# Patient Record
Sex: FEMALE | Race: WHITE | NOT HISPANIC OR LATINO
[De-identification: names, ages, dates, MRNs, and addresses within clinical notes are randomized per-mention and may not be internally consistent; named-entity substitution may affect disease eponyms.]

---

## 2017-11-02 ENCOUNTER — RECORD ABSTRACTING (OUTPATIENT)
Age: 67
End: 2017-11-02

## 2017-11-02 DIAGNOSIS — Z83.79 FAMILY HISTORY OF OTHER DISEASES OF THE DIGESTIVE SYSTEM: ICD-10-CM

## 2017-11-02 DIAGNOSIS — Z82.49 FAMILY HISTORY OF ISCHEMIC HEART DISEASE AND OTHER DISEASES OF THE CIRCULATORY SYSTEM: ICD-10-CM

## 2017-11-02 DIAGNOSIS — Z83.3 FAMILY HISTORY OF DIABETES MELLITUS: ICD-10-CM

## 2017-11-02 DIAGNOSIS — Z83.511 FAMILY HISTORY OF GLAUCOMA: ICD-10-CM

## 2017-11-02 DIAGNOSIS — Z80.0 FAMILY HISTORY OF MALIGNANT NEOPLASM OF DIGESTIVE ORGANS: ICD-10-CM

## 2017-11-06 ENCOUNTER — NON-APPOINTMENT (OUTPATIENT)
Age: 67
End: 2017-11-06

## 2017-11-06 ENCOUNTER — APPOINTMENT (OUTPATIENT)
Dept: FAMILY MEDICINE | Facility: CLINIC | Age: 67
End: 2017-11-06
Payer: COMMERCIAL

## 2017-11-06 VITALS
RESPIRATION RATE: 16 BRPM | TEMPERATURE: 98.7 F | OXYGEN SATURATION: 97 % | SYSTOLIC BLOOD PRESSURE: 118 MMHG | BODY MASS INDEX: 22.18 KG/M2 | HEART RATE: 66 BPM | DIASTOLIC BLOOD PRESSURE: 64 MMHG | WEIGHT: 138 LBS | HEIGHT: 66 IN

## 2017-11-06 DIAGNOSIS — Z82.69 FAMILY HISTORY OF OTHER DISEASES OF THE MUSCULOSKELETAL SYSTEM AND CONNECTIVE TISSUE: ICD-10-CM

## 2017-11-06 DIAGNOSIS — R73.02 IMPAIRED GLUCOSE TOLERANCE (ORAL): ICD-10-CM

## 2017-11-06 PROCEDURE — 93000 ELECTROCARDIOGRAM COMPLETE: CPT

## 2017-11-06 PROCEDURE — 99215 OFFICE O/P EST HI 40 MIN: CPT | Mod: 25

## 2017-11-06 RX ORDER — ZOLPIDEM TARTRATE 5 MG/1
5 TABLET, FILM COATED ORAL
Refills: 0 | Status: DISCONTINUED | COMMUNITY
End: 2017-11-06

## 2017-11-08 ENCOUNTER — RX RENEWAL (OUTPATIENT)
Age: 67
End: 2017-11-08

## 2017-11-09 ENCOUNTER — TRANSCRIPTION ENCOUNTER (OUTPATIENT)
Age: 67
End: 2017-11-09

## 2017-11-10 ENCOUNTER — RX RENEWAL (OUTPATIENT)
Age: 67
End: 2017-11-10

## 2017-11-27 ENCOUNTER — OTHER (OUTPATIENT)
Age: 67
End: 2017-11-27

## 2017-12-05 ENCOUNTER — RECORD ABSTRACTING (OUTPATIENT)
Age: 67
End: 2017-12-05

## 2018-06-04 ENCOUNTER — APPOINTMENT (OUTPATIENT)
Dept: FAMILY MEDICINE | Facility: CLINIC | Age: 68
End: 2018-06-04
Payer: COMMERCIAL

## 2018-06-04 VITALS
HEIGHT: 66 IN | TEMPERATURE: 98.8 F | OXYGEN SATURATION: 98 % | BODY MASS INDEX: 22.18 KG/M2 | WEIGHT: 138 LBS | HEART RATE: 57 BPM | DIASTOLIC BLOOD PRESSURE: 70 MMHG | SYSTOLIC BLOOD PRESSURE: 124 MMHG

## 2018-06-04 LAB
CYTOLOGY CVX/VAG DOC THIN PREP: NORMAL
S PYO AG SPEC QL IA: NORMAL

## 2018-06-04 PROCEDURE — 99214 OFFICE O/P EST MOD 30 MIN: CPT | Mod: 25

## 2018-06-04 PROCEDURE — 87880 STREP A ASSAY W/OPTIC: CPT | Mod: QW

## 2018-06-04 RX ORDER — DICLOFENAC SODIUM 100 MG/1
100 TABLET, FILM COATED, EXTENDED RELEASE ORAL
Qty: 30 | Refills: 0 | Status: COMPLETED | COMMUNITY
Start: 2017-12-05

## 2018-06-04 RX ORDER — AMOXICILLIN AND CLAVULANATE POTASSIUM 875; 125 MG/1; MG/1
875-125 TABLET, COATED ORAL
Qty: 28 | Refills: 0 | Status: COMPLETED | COMMUNITY
Start: 2018-01-08

## 2018-06-04 RX ORDER — ZOLPIDEM TARTRATE 5 MG/1
5 TABLET ORAL
Qty: 30 | Refills: 5 | Status: DISCONTINUED | COMMUNITY
Start: 2017-11-06 | End: 2018-06-04

## 2018-06-04 NOTE — REVIEW OF SYSTEMS
[Fatigue] : fatigue [Earache] : earache [Nasal Discharge] : nasal discharge [Sore Throat] : sore throat [Cough] : cough [Negative] : Gastrointestinal [Insomnia] : insomnia [Anxiety] : anxiety

## 2018-06-04 NOTE — PHYSICAL EXAM
[Ill-Appearing] : ill-appearing [Normal Rhythm/Effort] : normal respiratory rhythm and effort [Clear Bilaterally] : the lungs were clear to auscultation bilaterally [Normal to Percussion] : the lungs were normal to percussion [Normal] : palpation of the chest was normal [Normal Rate] : normal [Normal S1] : normal S1 [Normal S2] : normal S2 [No Murmur] : no murmurs heard [No Pitting Edema] : no pitting edema present [2+] : left 2+ [No Abnormalities] : the abdominal aorta was not enlarged and no bruit was heard [Normal Appearance] : was normal in appearance [Neck Supple] : was supple [Enlarged Diffusely] : was not enlarged [S3] : no S3 [S4] : no S4 [Right Carotid Bruit] : no bruit heard over the right carotid [Left Carotid Bruit] : no bruit heard over the left carotid [Right Femoral Bruit] : no bruit heard over the right femoral artery [Left Femoral Bruit] : no bruit heard over the left femoral artery [Normal Mental Status] : the patient's orientation, memory, attention, language and fund of knowledge were normal [Appropriate] : appropriate [Impaired judgment] : intact judgment [Impaired Insight] : intact insight [de-identified] : red

## 2018-06-04 NOTE — HISTORY OF PRESENT ILLNESS
[FreeTextEntry8] : Pt is here c/o sore throat, coughing up yellow phlegm, nasal congestion, slight bilateral ear pain, headaches on/off for about 8 days. She has not been getting better. Her headaches is very bad. SHe is around a lot of kids. \par \par I would also like to get renewals. The Lexapro is pretty good .It makes me calm and reduces anxiety. i am not depressed. I was getting chest tightness before which was anxiety and not depression. I don’t have it anymore. I had side effects in the beginning and they are gone. Sometimes I go back and forth taking 1 tab or 1 1/2 tabs. sometimes I think I causes headaches\par I like the Ambien It works pretty well. Most times I break it in half. It works very well

## 2018-06-04 NOTE — ASSESSMENT
[FreeTextEntry1] :  As per usual protocol the patient was advised in regards to the risks of driving when on medications with side effects of dizziness or drowsiness. Patient has been assessed  for increase risk for respiratory depression. Istop checked.\par Take antibiotics,  rest,  increase fluids, wash hands to prevent the spread of  infection . Take mucinex dm over the counter. Take tylenol or advil for fever or body aches. Follow up if no better or worse respiratory symptoms.\par throat culture negative\par renew meds for lexapro.\par deferred prev 13

## 2018-07-01 ENCOUNTER — MEDICATION RENEWAL (OUTPATIENT)
Age: 68
End: 2018-07-01

## 2019-05-02 ENCOUNTER — APPOINTMENT (OUTPATIENT)
Dept: FAMILY MEDICINE | Facility: CLINIC | Age: 69
End: 2019-05-02
Payer: COMMERCIAL

## 2019-05-02 VITALS
SYSTOLIC BLOOD PRESSURE: 120 MMHG | HEIGHT: 66 IN | DIASTOLIC BLOOD PRESSURE: 70 MMHG | HEART RATE: 73 BPM | WEIGHT: 141 LBS | OXYGEN SATURATION: 97 % | RESPIRATION RATE: 12 BRPM | BODY MASS INDEX: 22.66 KG/M2

## 2019-05-02 DIAGNOSIS — R92.2 INCONCLUSIVE MAMMOGRAM: ICD-10-CM

## 2019-05-02 PROCEDURE — 99213 OFFICE O/P EST LOW 20 MIN: CPT

## 2019-05-02 RX ORDER — AMOXICILLIN AND CLAVULANATE POTASSIUM 875; 125 MG/1; MG/1
875-125 TABLET, COATED ORAL
Qty: 20 | Refills: 0 | Status: DISCONTINUED | COMMUNITY
Start: 2018-06-04 | End: 2019-05-02

## 2019-05-02 RX ORDER — ESCITALOPRAM OXALATE 10 MG/1
10 TABLET ORAL DAILY
Qty: 90 | Refills: 1 | Status: DISCONTINUED | COMMUNITY
Start: 1900-01-01 | End: 2019-05-02

## 2019-05-02 NOTE — PHYSICAL EXAM
[Well Nourished] : well nourished [Well Developed] : well developed [Normal Outer Ear/Nose] : the outer ears and nose were normal in appearance [Normal Oropharynx] : the oropharynx was normal [Normal TMs] : both tympanic membranes were normal [Supple] : supple [No Lymphadenopathy] : no lymphadenopathy [Clear to Auscultation] : lungs were clear to auscultation bilaterally [No Respiratory Distress] : no respiratory distress  [No Accessory Muscle Use] : no accessory muscle use [Normal Rate] : normal rate  [Regular Rhythm] : with a regular rhythm [Normal S1, S2] : normal S1 and S2

## 2019-05-02 NOTE — HISTORY OF PRESENT ILLNESS
[FreeTextEntry1] : pt presents for med check  [de-identified] : 69 yo wf here for med check for insomnia and mood disorder.  I stopped the lexapro . I still feel the effects of the lexapro withdrawal. I am tired. I need to repeat thyroid sonogram. I have nodules

## 2019-05-02 NOTE — ASSESSMENT
[FreeTextEntry1] : renew medications\par   As per usual protocol the patient was advised in regards to the risks of driving when on medications with side effects of dizziness or drowsiness. Patient has been assessed  for increase risk for respiratory depression. Patient denies suicidal ideations or plan.  Istop checked.\par \par We spent sufficient time to discuss aspects of care; questions were answered  to patient's satisfaction.The diagnosis and care plan were discussed with patient in detail.  Patient test results were  reviewed and explained in full. All questions and concerns  were answered to the best of my knowledge.\par

## 2019-09-08 ENCOUNTER — RX RENEWAL (OUTPATIENT)
Age: 69
End: 2019-09-08

## 2020-03-12 ENCOUNTER — APPOINTMENT (OUTPATIENT)
Dept: FAMILY MEDICINE | Facility: CLINIC | Age: 70
End: 2020-03-12
Payer: COMMERCIAL

## 2020-03-12 VITALS
SYSTOLIC BLOOD PRESSURE: 118 MMHG | BODY MASS INDEX: 22.5 KG/M2 | DIASTOLIC BLOOD PRESSURE: 70 MMHG | OXYGEN SATURATION: 97 % | RESPIRATION RATE: 12 BRPM | HEIGHT: 66 IN | HEART RATE: 64 BPM | WEIGHT: 140 LBS
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
## 2020-03-12 DIAGNOSIS — Z11.4 ENCOUNTER FOR SCREENING FOR HUMAN IMMUNODEFICIENCY VIRUS [HIV]: ICD-10-CM

## 2020-03-12 DIAGNOSIS — Z87.898 PERSONAL HISTORY OF OTHER SPECIFIED CONDITIONS: ICD-10-CM

## 2020-03-12 DIAGNOSIS — Z87.09 PERSONAL HISTORY OF OTHER DISEASES OF THE RESPIRATORY SYSTEM: ICD-10-CM

## 2020-03-12 DIAGNOSIS — M24.131 OTHER ARTICULAR CARTILAGE DISORDERS, RIGHT WRIST: ICD-10-CM

## 2020-03-12 DIAGNOSIS — M24.132 OTHER ARTICULAR CARTILAGE DISORDERS, LEFT WRIST: ICD-10-CM

## 2020-03-12 DIAGNOSIS — H91.90 UNSPECIFIED HEARING LOSS, UNSPECIFIED EAR: ICD-10-CM

## 2020-03-12 DIAGNOSIS — Z86.69 PERSONAL HISTORY OF OTHER DISEASES OF THE NERVOUS SYSTEM AND SENSE ORGANS: ICD-10-CM

## 2020-03-12 DIAGNOSIS — Z23 ENCOUNTER FOR IMMUNIZATION: ICD-10-CM

## 2020-03-12 DIAGNOSIS — F39 UNSPECIFIED MOOD [AFFECTIVE] DISORDER: ICD-10-CM

## 2020-03-12 PROCEDURE — 90653 IIV ADJUVANT VACCINE IM: CPT

## 2020-03-12 PROCEDURE — G0008: CPT

## 2020-03-12 PROCEDURE — 99213 OFFICE O/P EST LOW 20 MIN: CPT | Mod: 25

## 2020-03-12 NOTE — PHYSICAL EXAM
[Scattered Wheezes] : scattered wheezing was heard [Exp Wheezing] : expiratory wheezing was heard [Normal] : no carotid or abdominal bruits heard, no varicosities, pedal pulses are present, no peripheral edema, no extremity clubbing or cyanosis and no palpable aorta

## 2020-03-12 NOTE — HISTORY OF PRESENT ILLNESS
[FreeTextEntry1] : pt presents for med check  [de-identified] : 70 yo wf here for insomnia. I take 1/2 of ambien q night it works well\par I am due for the flu shot\par I have dense breasts Ins would not cover sonogram. Can I order one?\par I have a thyroid nodule I do an annual sonogram\par I have left ear hearing loss-can I get a referral\par i am seeing gyn and will pap and bone density. \par Otherwise patient reports feeling well.  Patient specifically denies chest pain, dyspnea on exertion, edema, PND, orthopnea, dizziness, or syncope. Patient reports compliance with medications. Patient denies fever, chills, night sweats, nausea, vomiting , no pain, erythema, swollen joints, hematuria, hematochezia , hematemesis, or melena.\par ( she is seeing dr stephen for her tfcc bilateral wrists)

## 2020-03-12 NOTE — HEALTH RISK ASSESSMENT
[Patient reported PAP Smear was normal] : Patient reported PAP Smear was normal [Patient reported bone density results were normal] : Patient reported bone density results were normal [Patient reported colonoscopy was normal] : Patient reported colonoscopy was normal [HIV test declined] : HIV test declined [Hepatitis C test declined] : Hepatitis C test declined [Alone] : lives alone [# of Members in Household ___] :  household currently consist of [unfilled] member(s) [Employed] : employed [College] : College [Single] : single [Feels Safe at Home] : Feels safe at home [Fully functional (bathing, dressing, toileting, transferring, walking, feeding)] : Fully functional (bathing, dressing, toileting, transferring, walking, feeding) [Fully functional (using the telephone, shopping, preparing meals, housekeeping, doing laundry, using] : Fully functional and needs no help or supervision to perform IADLs (using the telephone, shopping, preparing meals, housekeeping, doing laundry, using transportation, managing medications and managing finances) [Reports normal functional visual acuity (ie: able to read med bottle)] : Reports normal functional visual acuity [Smoke Detector] : smoke detector [Carbon Monoxide Detector] : carbon monoxide detector [Safety elements used in home] : safety elements used in home [Seat Belt] :  uses seat belt [Sunscreen] : uses sunscreen [No] : In the past 12 months have you used drugs other than those required for medical reasons? No [No falls in past year] : Patient reported no falls in the past year [Change in mental status noted] : No change in mental status noted [Language] : denies difficulty with language [Handling Complex Tasks] : denies difficulty handling complex tasks [Sexually Active] : not sexually active [Reports changes in hearing] : Reports no changes in hearing [Reports changes in vision] : Reports no changes in vision [Reports changes in dental health] : Reports no changes in dental health [Guns at Home] : no guns at home [Travel to Developing Areas] : does not  travel to developing areas [Caregiver Concerns] : does not have caregiver concerns [MammogramDate] : 2019 [PapSmearDate] : 2019 [BoneDensityDate] : 2019 [ColonoscopyDate] : 2018 [de-identified] : rn [] : No [de-identified] : no [de-identified] : gyn dr stephen [Audit-CScore] : 0 [de-identified] : gym planks [de-identified] : no

## 2020-03-12 NOTE — ASSESSMENT
[FreeTextEntry1] : ambien renewed  As per usual protocol the patient was advised in regards to the risks of driving when on medications with side effects of dizziness or drowsiness. Patient has been assessed  for increase risk for respiratory depression. Patient denies suicidal ideations or plan.  Istop checked.\par follow up ent\par  Information regarding flu shot reviewed. All questions answered.Flu shot .5 cc IM  left    deltoid . No reaction noted. Advised patients to wash hands to reduce the spread of infection\par thyroid sono this summer\par breast us for dense breast\par We spent sufficient time to discuss aspects of care; questions were answered  to patient's satisfaction.The diagnosis and care plan were discussed with patient in detail.  Patient test results were  reviewed and explained in full. All questions and concerns  were answered to the best of my knowledge.\par

## 2020-05-14 ENCOUNTER — TRANSCRIPTION ENCOUNTER (OUTPATIENT)
Age: 70
End: 2020-05-14

## 2020-05-14 DIAGNOSIS — Z11.59 ENCOUNTER FOR SCREENING FOR OTHER VIRAL DISEASES: ICD-10-CM

## 2020-05-20 LAB
SARS-COV-2 IGG SERPL IA-ACNC: 0.1 INDEX
SARS-COV-2 IGG SERPL QL IA: NEGATIVE

## 2020-10-12 ENCOUNTER — TRANSCRIPTION ENCOUNTER (OUTPATIENT)
Age: 70
End: 2020-10-12

## 2020-10-13 ENCOUNTER — TRANSCRIPTION ENCOUNTER (OUTPATIENT)
Age: 70
End: 2020-10-13

## 2020-10-21 ENCOUNTER — APPOINTMENT (OUTPATIENT)
Dept: FAMILY MEDICINE | Facility: CLINIC | Age: 70
End: 2020-10-21
Payer: COMMERCIAL

## 2020-10-21 VITALS
WEIGHT: 140 LBS | HEART RATE: 69 BPM | TEMPERATURE: 98.3 F | BODY MASS INDEX: 23.32 KG/M2 | SYSTOLIC BLOOD PRESSURE: 110 MMHG | RESPIRATION RATE: 14 BRPM | OXYGEN SATURATION: 98 % | DIASTOLIC BLOOD PRESSURE: 70 MMHG | HEIGHT: 65 IN

## 2020-10-21 DIAGNOSIS — Z12.39 ENCOUNTER FOR OTHER SCREENING FOR MALIGNANT NEOPLASM OF BREAST: ICD-10-CM

## 2020-10-21 PROCEDURE — G0009: CPT

## 2020-10-21 PROCEDURE — 99213 OFFICE O/P EST LOW 20 MIN: CPT | Mod: 25

## 2020-10-21 PROCEDURE — 99072 ADDL SUPL MATRL&STAF TM PHE: CPT

## 2020-10-21 PROCEDURE — 90732 PPSV23 VACC 2 YRS+ SUBQ/IM: CPT

## 2020-10-21 NOTE — HISTORY OF PRESENT ILLNESS
[FreeTextEntry1] : follow up [de-identified] : 71 yo wf here for ambien it helps her fall asleep  she gets 6 hrs  she stays asleep unless she has to go to the bathroom no side effects.\par \par I have to do thyroid sonogram. I had nodules biopsied in the past\par \par she has asthma she rarely uses ventolin\par \par \par \par

## 2020-10-21 NOTE — ASSESSMENT
[FreeTextEntry1] : Basic cardiovascular prevention measures are advised including regular exercise, surveillance medical examination, and prudent portion-controlled low fat diet, rich in a variety of vegetables with minimal added sugars, refined starches, and no artificially hydrogenated oils.\par  As per usual protocol the patient was advised in regards to the risks of driving when on medications with side effects of dizziness or drowsiness. Patient has been assessed  for increase risk for respiratory depression. Patient denies suicidal ideations or plan.  Istop checked.\par Information regarding       immunization reviewed. All questions answered. Immunization given   .5 cc  intramuscular      left   deltoid. No reaction noted. Advised patients to wash hands to reduce the spread of infection\par mammogram us breast due dec\par lab slip given for  cholesterol and thyroid

## 2020-10-26 ENCOUNTER — TRANSCRIPTION ENCOUNTER (OUTPATIENT)
Age: 70
End: 2020-10-26

## 2020-10-26 LAB — COMPREHENSIVE SCREEN URINE: NORMAL

## 2020-12-08 ENCOUNTER — TRANSCRIPTION ENCOUNTER (OUTPATIENT)
Age: 70
End: 2020-12-08

## 2020-12-09 ENCOUNTER — TRANSCRIPTION ENCOUNTER (OUTPATIENT)
Age: 70
End: 2020-12-09

## 2020-12-09 DIAGNOSIS — E04.2 NONTOXIC MULTINODULAR GOITER: ICD-10-CM

## 2020-12-26 ENCOUNTER — TRANSCRIPTION ENCOUNTER (OUTPATIENT)
Age: 70
End: 2020-12-26

## 2021-01-17 ENCOUNTER — TRANSCRIPTION ENCOUNTER (OUTPATIENT)
Age: 71
End: 2021-01-17

## 2021-01-18 ENCOUNTER — TRANSCRIPTION ENCOUNTER (OUTPATIENT)
Age: 71
End: 2021-01-18

## 2021-02-02 ENCOUNTER — TRANSCRIPTION ENCOUNTER (OUTPATIENT)
Age: 71
End: 2021-02-02

## 2021-03-11 ENCOUNTER — TRANSCRIPTION ENCOUNTER (OUTPATIENT)
Age: 71
End: 2021-03-11

## 2021-03-12 ENCOUNTER — TRANSCRIPTION ENCOUNTER (OUTPATIENT)
Age: 71
End: 2021-03-12

## 2021-03-24 ENCOUNTER — TRANSCRIPTION ENCOUNTER (OUTPATIENT)
Age: 71
End: 2021-03-24

## 2021-03-25 ENCOUNTER — TRANSCRIPTION ENCOUNTER (OUTPATIENT)
Age: 71
End: 2021-03-25

## 2021-08-03 ENCOUNTER — TRANSCRIPTION ENCOUNTER (OUTPATIENT)
Age: 71
End: 2021-08-03

## 2021-08-19 ENCOUNTER — TRANSCRIPTION ENCOUNTER (OUTPATIENT)
Age: 71
End: 2021-08-19

## 2021-08-20 ENCOUNTER — TRANSCRIPTION ENCOUNTER (OUTPATIENT)
Age: 71
End: 2021-08-20

## 2021-09-27 ENCOUNTER — APPOINTMENT (OUTPATIENT)
Dept: FAMILY MEDICINE | Facility: CLINIC | Age: 71
End: 2021-09-27
Payer: COMMERCIAL

## 2021-09-27 VITALS
TEMPERATURE: 97.7 F | WEIGHT: 144 LBS | OXYGEN SATURATION: 96 % | HEIGHT: 65 IN | HEART RATE: 63 BPM | BODY MASS INDEX: 23.99 KG/M2 | SYSTOLIC BLOOD PRESSURE: 110 MMHG | RESPIRATION RATE: 15 BRPM | DIASTOLIC BLOOD PRESSURE: 72 MMHG

## 2021-09-27 PROCEDURE — 99213 OFFICE O/P EST LOW 20 MIN: CPT

## 2021-09-27 RX ORDER — ESCITALOPRAM OXALATE 10 MG/1
10 TABLET ORAL
Qty: 30 | Refills: 6 | Status: DISCONTINUED | COMMUNITY
Start: 2021-03-11 | End: 2021-09-27

## 2021-09-27 NOTE — HISTORY OF PRESENT ILLNESS
[FreeTextEntry1] : follow up [de-identified] : 72 yo wf here for ambien it helps her fall asleep  she gets 6 hrs  she stays asleep unless she has to go to the bathroom no side effects.\par  \par \par she has asthma she rarely uses ventolin\par \par she is on the pravastatin. \par \par \par

## 2021-09-27 NOTE — ASSESSMENT
[FreeTextEntry1] : Basic cardiovascular prevention measures are advised including regular exercise, surveillance medical examination, and prudent portion-controlled low fat diet, rich in a variety of vegetables with minimal added sugars, refined starches, and no artificially hydrogenated oils.\par  As per usual protocol the patient was advised in regards to the risks of driving when on medications with side effects of dizziness or drowsiness. Patient has been assessed  for increase risk for respiratory depression. Patient denies suicidal ideations or plan.  Istop checked.\par  \par lab slip given for  cholesterol / cbc cmp tft\par mammogram utd\par colonoscopy utd\par \par refused flu shot

## 2021-10-28 RX ORDER — PRAVASTATIN SODIUM 10 MG/1
10 TABLET ORAL
Qty: 90 | Refills: 3 | Status: DISCONTINUED | COMMUNITY
Start: 2021-01-18 | End: 2021-10-28

## 2021-10-29 ENCOUNTER — TRANSCRIPTION ENCOUNTER (OUTPATIENT)
Age: 71
End: 2021-10-29

## 2022-01-24 ENCOUNTER — RX RENEWAL (OUTPATIENT)
Age: 72
End: 2022-01-24

## 2022-03-01 ENCOUNTER — RX RENEWAL (OUTPATIENT)
Age: 72
End: 2022-03-01

## 2022-03-03 ENCOUNTER — RX RENEWAL (OUTPATIENT)
Age: 72
End: 2022-03-03

## 2022-05-06 ENCOUNTER — APPOINTMENT (OUTPATIENT)
Dept: PAIN MANAGEMENT | Facility: CLINIC | Age: 72
End: 2022-05-06
Payer: OTHER MISCELLANEOUS

## 2022-05-06 PROCEDURE — 99072 ADDL SUPL MATRL&STAF TM PHE: CPT

## 2022-05-06 PROCEDURE — 62323 NJX INTERLAMINAR LMBR/SAC: CPT

## 2022-05-06 NOTE — PROCEDURE
[FreeTextEntry3] : Date of Service: 05/06/2022 \par \par Account: [id number]\par \par Patient: SASHA LANCASTER \par \par YOB: 1950\par \par Age: 71 year\par \par Surgeon:      Madhu Bacon DO\par \par Assistant:    None\par \par Pre-Operative Diagnosis:         Lumbosacral Radiculitis (M54.17)\par \par Post Operative Diagnosis:       Lumbosacral Radiculitis (M54.17)   \par \par Procedure:             Lumbar interlaminar (L5-S1) epidural steroid injection under fluoroscopic guidance\par \par Anesthesia:            MAC\par \par This procedure was carried out using fluoroscopic guidance.  The risks and benefits of the procedure were discussed extensively with the patient.  The consent of the patient was obtained and the following procedure was performed. The patient was placed in the prone position on the fluoroscopy table and the area was prepped and draped in a sterile fashion.  A timeout was performed with all essential staff present and the site and side were verified.\par \par The patient was placed in the prone position with a pillow under the abdomen to minimize the lumbar lordosis.  The lumbar area was prepped and draped in a sterile fashion.  Under A/P view with slight cephalad-caudad angulation, the L5-S1 interspace was identified and marked.  Using sterile technique the superficial skin was anesthetized with 1% Lidocaine.  A 20 gauge Tuohy needle was advanced into the epidural space under fluoroscopy using loss of resistance at the L5-S1 level.  After negative aspiration for heme or CSF, an epidurogram was obtained in the A/P and lateral fluoroscopic views using 2-3 cc of Omnipaque contrast confirming epidural placement of the needle.  After this, 5 cc of of a mixture of preservative free normal saline and 80 mg of Kenalog were injected into the epidural space. \par \par The needle was subsequently removed.  Vital signs remained normal.  Pulse oximeter was used throughout the procedure and the patient's pulse and oxygen saturation remained within normal limits.  The patient tolerated the procedure well.  There were no complications.  The patient was instructed to apply ice over the injection sites for twenty minutes every two hours for the next 24 to 48 hours.\par \par Disposition:\par      1. The patient was advised to F/U in 1-2 weeks to assess the response to the injection.\par      2. The patient was also instructed to contact me immediately if there were any concerns related to the procedure performed.

## 2022-05-23 ENCOUNTER — APPOINTMENT (OUTPATIENT)
Dept: ORTHOPEDIC SURGERY | Facility: CLINIC | Age: 72
End: 2022-05-23

## 2022-06-23 ENCOUNTER — APPOINTMENT (OUTPATIENT)
Dept: ORTHOPEDIC SURGERY | Facility: CLINIC | Age: 72
End: 2022-06-23
Payer: OTHER MISCELLANEOUS

## 2022-06-23 DIAGNOSIS — Z78.9 OTHER SPECIFIED HEALTH STATUS: ICD-10-CM

## 2022-06-23 PROCEDURE — 99072 ADDL SUPL MATRL&STAF TM PHE: CPT

## 2022-06-23 PROCEDURE — 99215 OFFICE O/P EST HI 40 MIN: CPT

## 2022-06-27 NOTE — DISCUSSION/SUMMARY
[Surgical risks reviewed] : Surgical risks reviewed [de-identified] : I discussed non operative and operative treatment options in great detail with the patient. I discussed treatment options, including but not limited to,\par 1. Non operative treatment - rest, NSAID, physical therapy etc.\par 2. Interventional treatment - injections etc.\par 3. Surgical treatment - Laminectomy and fusion L4-5, autograft, allograft. \par \par Patient wants to proceed with surgical intervention after failing nonoperative care. I had a lengthy discussion with the patient about the rational and goal of the surgery as well as expected outcome. I encouraged patient to seek a second opinion regarding surgery. I explained postoperative rehabilitation and recovery process to the patient. I discussed risks, benefits and alternatives of the procedure in detail with the patient. I counseled patient about risks and possible complications, including but not limited to, infection, bleeding, nerve injury, arterial and venous injury, single or multiple muscle group weakness, dural tear, CSF leak, pseudomeningocele, arachnoiditis, CSF fistula, meningitis, discitis, osteomyelitis, epidural hematoma, DVT, PE, CRPS, MI, paralysis, pseudoarthrosis, instrumentation malposition, adjacent segment disease, persistent symptoms, and risks of anesthesia. I explained to the patient that the surgical outcome is unpredictable and there is no guarantee that the symptoms will resolve after the surgery. The patient understands and wishes to proceed. All questions were answered and patient was given information to review.\par \par \par

## 2022-06-27 NOTE — HISTORY OF PRESENT ILLNESS
[de-identified] : Follow up lumbar spine. Has had 2 LADAN, last one was May 6th, reports minimal relief from these. Taking Motrin and using Voltaren Gel PRN. Injections with PM did not help, patient would like to discuss next steps.

## 2022-06-27 NOTE — REASON FOR VISIT
[FreeTextEntry2] : lower back pain after climbing stairs, tripping and hurt back and both knees on 7/29/21 WC INJURY

## 2022-07-18 ENCOUNTER — APPOINTMENT (OUTPATIENT)
Dept: FAMILY MEDICINE | Facility: CLINIC | Age: 72
End: 2022-07-18

## 2022-07-18 VITALS
OXYGEN SATURATION: 96 % | BODY MASS INDEX: 20.66 KG/M2 | SYSTOLIC BLOOD PRESSURE: 112 MMHG | RESPIRATION RATE: 15 BRPM | DIASTOLIC BLOOD PRESSURE: 68 MMHG | WEIGHT: 124 LBS | HEIGHT: 65 IN | HEART RATE: 60 BPM

## 2022-07-18 VITALS — TEMPERATURE: 97.2 F

## 2022-07-18 DIAGNOSIS — R10.9 UNSPECIFIED ABDOMINAL PAIN: ICD-10-CM

## 2022-07-18 DIAGNOSIS — H91.90 UNSPECIFIED HEARING LOSS, UNSPECIFIED EAR: ICD-10-CM

## 2022-07-18 LAB — CYTOLOGY CVX/VAG DOC THIN PREP: NORMAL

## 2022-07-18 PROCEDURE — 99214 OFFICE O/P EST MOD 30 MIN: CPT

## 2022-07-18 RX ORDER — ZOLPIDEM TARTRATE 5 MG/1
5 TABLET ORAL
Refills: 0 | Status: COMPLETED | COMMUNITY
End: 2022-07-18

## 2022-07-18 RX ORDER — ESCITALOPRAM OXALATE 10 MG/1
10 TABLET ORAL
Refills: 0 | Status: COMPLETED | COMMUNITY
End: 2022-07-18

## 2022-07-18 RX ORDER — NAPROXEN 500 MG/1
500 TABLET ORAL
Refills: 0 | Status: COMPLETED | COMMUNITY
End: 2022-07-18

## 2022-07-18 RX ORDER — ZOLPIDEM TARTRATE 5 MG/1
5 TABLET, FILM COATED ORAL
Refills: 0 | Status: COMPLETED | COMMUNITY
End: 2022-07-18

## 2022-07-18 RX ORDER — PRAVASTATIN SODIUM 20 MG/1
20 TABLET ORAL
Refills: 0 | Status: COMPLETED | COMMUNITY
End: 2022-07-18

## 2022-07-18 RX ORDER — ALBUTEROL SULFATE 90 UG/1
108 (90 BASE) AEROSOL, METERED RESPIRATORY (INHALATION)
Qty: 1 | Refills: 11 | Status: COMPLETED | COMMUNITY
End: 2022-07-18

## 2022-07-18 RX ORDER — OXYCODONE AND ACETAMINOPHEN 5; 325 MG/1; MG/1
5-325 TABLET ORAL
Qty: 30 | Refills: 0 | Status: COMPLETED | COMMUNITY
Start: 2022-01-19 | End: 2022-07-18

## 2022-07-18 RX ORDER — METHYLPREDNISOLONE 4 MG/1
4 TABLET ORAL
Qty: 21 | Refills: 0 | Status: COMPLETED | COMMUNITY
Start: 2022-01-31 | End: 2022-07-18

## 2022-07-18 RX ORDER — METHYLPREDNISOLONE 4 MG/1
4 TABLET ORAL
Refills: 0 | Status: COMPLETED | COMMUNITY
End: 2022-07-18

## 2022-07-18 NOTE — HISTORY OF PRESENT ILLNESS
[FreeTextEntry1] : follow up [de-identified] : 70 yo wf here for follow up  hyperlipidemia, insomnia. and also covid. \par  she stopped  pravastatin .  she stopped eating meat  x 9 months. . she also has lost weight  20 pounds in 3 months w her dietary changes- she reduced calories by 1000 .  \par she had covid 2 m ago she feels weak,  she feels she has long hauler symptoms. slight cough,  she still feels off,. not dizzy but just off. she does not push her self If she does something she has to relax. She never had a fever but just had a cough and headaches.\par she does take advil for her orthopedic issues and it seems to help her.  symptoms from covid. \par she lost 20 punds intentional cutting calories. \par I would like a hearing eval . \par I have a bump on my left leg and i didn’t hit it. what could it be?\par I am due for mammo breast us\par \par \par 9/27/21  ambien it helps her fall asleep  she gets 6 hrs  she stays asleep unless she has to go to the bathroom no side effects.\par  \par \par she has asthma she rarely uses ventolin\par \par she is on the pravastatin. \par \par \par

## 2022-07-18 NOTE — REVIEW OF SYSTEMS
[Insomnia] : insomnia [Negative] : Respiratory [Recent Change In Weight] : ~T recent weight change [Cough] : cough [Joint Pain] : joint pain [Back Pain] : back pain [FreeTextEntry2] : 20 pounds [de-identified] : lightheaded

## 2022-07-18 NOTE — CURRENT MEDS
[Takes medication as prescribed] : takes [None] : Patient does not have any barriers to medication adherence [Yes] : Reviewed medication list for presence of high-risk medications. [Sedatives] : sedatives [FreeTextEntry1] : she takes 1/2 zolpidem

## 2022-07-18 NOTE — PHYSICAL EXAM
[Normal] : affect was normal and insight and judgment were intact [de-identified] : left anterior shin " varicosity " or hematoma. 1 x 1 cm  sl firm  swollen non tender superficial  [de-identified] : epigastric pain guarding  ( consistent)

## 2022-07-18 NOTE — ASSESSMENT
[FreeTextEntry1] : Basic cardiovascular prevention measures are advised including regular exercise, surveillance medical examination, and prudent portion-controlled low fat diet, rich in a variety of vegetables with minimal added sugars, refined starches, and no artificially hydrogenated oils.\par \par renew ambien\par  As per usual protocol the patient was advised in regards to the risks of driving when on medications with side effects of dizziness or drowsiness. Patient has been assessed  for increase risk for respiratory depression. Patient denies suicidal ideations or plan.  Istop checked.\par  \par lab slip given for  cholesterol / cbc cmp tft to evaluate cholesterol and lightheadedness patient had covid 2 months ago she has covid long hauler symptoms fatigue, lightheaded, cough\par \par mammogram due breast us due for dense breast \par colonoscopy utd 2018\par suggest endoscopy due toclinical exam she had consistently guarded when i pressed and she has been taking advil and she has lost 20 pounds.\par \par follow up hearing eval\par  monitor left anterior shin .I offered us of leg. she is opting to monitor it .Follow up worse pain, swelling \par

## 2022-07-18 NOTE — HEALTH RISK ASSESSMENT
[Never] : Never [No] : In the past 12 months have you used drugs other than those required for medical reasons? No [No falls in past year] : Patient reported no falls in the past year [de-identified] : wrist surgery in feb [de-identified] : ortho [Audit-CScore] : 0 [de-identified] : cut calories

## 2022-07-20 ENCOUNTER — TRANSCRIPTION ENCOUNTER (OUTPATIENT)
Age: 72
End: 2022-07-20

## 2022-07-26 DIAGNOSIS — D64.9 ANEMIA, UNSPECIFIED: ICD-10-CM

## 2022-07-26 DIAGNOSIS — R53.83 OTHER FATIGUE: ICD-10-CM

## 2022-09-13 ENCOUNTER — APPOINTMENT (OUTPATIENT)
Dept: ORTHOPEDIC SURGERY | Facility: CLINIC | Age: 72
End: 2022-09-13

## 2022-09-13 VITALS — WEIGHT: 124 LBS | HEIGHT: 65 IN | BODY MASS INDEX: 20.66 KG/M2

## 2022-09-13 PROCEDURE — 99214 OFFICE O/P EST MOD 30 MIN: CPT

## 2022-09-13 PROCEDURE — 99072 ADDL SUPL MATRL&STAF TM PHE: CPT

## 2022-09-13 NOTE — HISTORY OF PRESENT ILLNESS
[Work related] : work related [Sudden] : sudden [6] : 6 [5] : 5 [Dull/Aching] : dull/aching [Throbbing] : throbbing [Intermittent] : intermittent [Household chores] : household chores [Leisure] : leisure [Sleep] : sleep [Social interactions] : social interactions [Rest] : rest [Not working due to injury] : Work status: not working due to injury [de-identified] : Patient is here for a follow up for her left knee. Patient is being treated for Primary osteoarthritis, tear of medial meniscus of left knee and tear of lateral meniscus of left knee. Patient had positive KEREN. Patient was last see by Dr Randall 3/28/2022. Patient had MRI at [facility]. Patient states left knee is catching and aching. Patient states she has medial and lateral pain. Patient would like to submit auth for Left knee arthroscopy with partial medial and lateral meniscectomies and possible\par shaving chondroplasty\par \par \par IMPRESSION:\par \par \par Moderate LEFT knee osteoarthritis with full-thickness medial and patellofemoral\par compartment cartilage wear. Complex meniscal degeneration and nondisplaced\par tearing. Small popliteal cyst. [] : Post Surgical Visit: no [FreeTextEntry1] : Left knee [FreeTextEntry3] : 7/29/2021 [FreeTextEntry5] : Patient states she was climbing stairs and tripped. Patient\par states she twisted her right knee and as she was correcting herself she twisted her left knee. [de-identified] : Movement

## 2022-09-13 NOTE — ASSESSMENT
[FreeTextEntry1] : 71 yo female presenting for f/u of left knee moderate oa, complex medial and lateral mensical tears\par -Discussed risks, benefits, alternatives of left knee arthroscopy with partial medial and lateral meniscectomies\par Levitz Arthroscopy\par We had an extensive discussion regarding surgical intervention including risk, benefits and alternatives. The risks include, but are not limited to infection, bleeding, injury to small nerves and blood vessels, pain, stiffness, phlebitis, DVT and need for secondary procedures. Preoperative, intraoperative and postoperative care were discussed and outlined to the patient as well.\par -Discussed with patient that since she has advanced oa that she may have persisting pain after arthroscopic surgery due to the oa. Patient understands\par -Activities as tolerated\par -Rest, ice, compression, elevation, NSAIDs PRN for pain. Discussed with patient that she should only take NSAIDs PRN as taking meds chronically can cause kidney damage. Patient understands\par -All questions answered\par -F/u after surgery

## 2022-09-21 ENCOUNTER — APPOINTMENT (OUTPATIENT)
Dept: ORTHOPEDIC SURGERY | Facility: CLINIC | Age: 72
End: 2022-09-21

## 2022-09-21 VITALS — BODY MASS INDEX: 20.66 KG/M2 | WEIGHT: 124 LBS | HEIGHT: 65 IN

## 2022-09-21 PROCEDURE — 99215 OFFICE O/P EST HI 40 MIN: CPT

## 2022-09-21 PROCEDURE — 99072 ADDL SUPL MATRL&STAF TM PHE: CPT

## 2022-09-21 NOTE — ASSESSMENT
[FreeTextEntry1] : Will request new lumbar MRI without contrast for surgical planning.  Patient current MRI is older than 1 year.\par Patient given prescription for MRI, follow up after study is completed to discuss results. \par \par Patient is also awaiting for approval for Knee arthroscopy with Dr. Randall. \par \par Recommend: - NSAID - Heating pad - Muscle relaxer - Core strengthening exercise - Hamstring stretching exercise Patient is given back rehabilitation exercise book. \par \par Continue HEP\par \par Worker's compensation 100% temporarily disabled

## 2022-09-21 NOTE — HISTORY OF PRESENT ILLNESS
[de-identified] : Follow up lumbar spine. Experiencing constant pain radiating into bilateral thighs. WC approved lumbar fusion. Taking Motrin and using Voltaren Gel PRN. Would like to get an updated MRI.

## 2022-10-05 ENCOUNTER — FORM ENCOUNTER (OUTPATIENT)
Age: 72
End: 2022-10-05

## 2022-10-18 ENCOUNTER — RESULT REVIEW (OUTPATIENT)
Age: 72
End: 2022-10-18

## 2022-10-28 ENCOUNTER — APPOINTMENT (OUTPATIENT)
Dept: ORTHOPEDIC SURGERY | Facility: AMBULATORY SURGERY CENTER | Age: 72
End: 2022-10-28

## 2022-10-31 ENCOUNTER — APPOINTMENT (OUTPATIENT)
Dept: PHYSICAL MEDICINE AND REHAB | Facility: CLINIC | Age: 72
End: 2022-10-31

## 2022-10-31 VITALS
SYSTOLIC BLOOD PRESSURE: 114 MMHG | TEMPERATURE: 97.5 F | HEART RATE: 66 BPM | WEIGHT: 124 LBS | RESPIRATION RATE: 16 BRPM | BODY MASS INDEX: 20.66 KG/M2 | HEIGHT: 65 IN | OXYGEN SATURATION: 97 % | DIASTOLIC BLOOD PRESSURE: 74 MMHG

## 2022-10-31 DIAGNOSIS — S83.272A COMPLEX TEAR OF LATERAL MENISCUS, CURRENT INJURY, LEFT KNEE, INITIAL ENCOUNTER: ICD-10-CM

## 2022-10-31 DIAGNOSIS — S83.232A COMPLEX TEAR OF MEDIAL MENISCUS, CURRENT INJURY, LEFT KNEE, INITIAL ENCOUNTER: ICD-10-CM

## 2022-10-31 DIAGNOSIS — Z01.818 ENCOUNTER FOR OTHER PREPROCEDURAL EXAMINATION: ICD-10-CM

## 2022-10-31 PROCEDURE — 93000 ELECTROCARDIOGRAM COMPLETE: CPT | Mod: ACP

## 2022-10-31 PROCEDURE — 99072 ADDL SUPL MATRL&STAF TM PHE: CPT | Mod: ACP

## 2022-10-31 PROCEDURE — 99244 OFF/OP CNSLTJ NEW/EST MOD 40: CPT | Mod: ACP,25

## 2022-10-31 NOTE — HISTORY OF PRESENT ILLNESS
[FreeTextEntry1] : Medical clearance [de-identified] : Patient presents today for medical clearance requested by Dr. Randall prior to having LEft knee surgery.  States she has been doing well aside from her knee.  denies any CP, SOB or diff breathing.  no recent fever, chills, cough or cold type symptoms.  She has no other complaints at this time.

## 2022-10-31 NOTE — PHYSICAL EXAM
[No Acute Distress] : no acute distress [Well Nourished] : well nourished [Well Developed] : well developed [Well-Appearing] : well-appearing [Normal Sclera/Conjunctiva] : normal sclera/conjunctiva [PERRL] : pupils equal round and reactive to light [EOMI] : extraocular movements intact [Normal Outer Ear/Nose] : the outer ears and nose were normal in appearance [Normal Oropharynx] : the oropharynx was normal [No JVD] : no jugular venous distention [No Lymphadenopathy] : no lymphadenopathy [Supple] : supple [Thyroid Normal, No Nodules] : the thyroid was normal and there were no nodules present [No Respiratory Distress] : no respiratory distress  [No Accessory Muscle Use] : no accessory muscle use [Clear to Auscultation] : lungs were clear to auscultation bilaterally [Normal Rate] : normal rate  [Regular Rhythm] : with a regular rhythm [Normal S1, S2] : normal S1 and S2 [No Murmur] : no murmur heard [No Carotid Bruits] : no carotid bruits [No Abdominal Bruit] : a ~M bruit was not heard ~T in the abdomen [No Varicosities] : no varicosities [Pedal Pulses Present] : the pedal pulses are present [No Edema] : there was no peripheral edema [No Palpable Aorta] : no palpable aorta [No Extremity Clubbing/Cyanosis] : no extremity clubbing/cyanosis [Soft] : abdomen soft [Non Tender] : non-tender [Non-distended] : non-distended [No Masses] : no abdominal mass palpated [No HSM] : no HSM [Normal Bowel Sounds] : normal bowel sounds [Normal Posterior Cervical Nodes] : no posterior cervical lymphadenopathy [Normal Anterior Cervical Nodes] : no anterior cervical lymphadenopathy [No CVA Tenderness] : no CVA  tenderness [No Spinal Tenderness] : no spinal tenderness [Grossly Normal Strength/Tone] : grossly normal strength/tone [No Rash] : no rash [Coordination Grossly Intact] : coordination grossly intact [No Focal Deficits] : no focal deficits [Normal Gait] : normal gait [Deep Tendon Reflexes (DTR)] : deep tendon reflexes were 2+ and symmetric [Normal Affect] : the affect was normal [Normal Insight/Judgement] : insight and judgment were intact [de-identified] : Left knee pain

## 2022-10-31 NOTE — PLAN
[FreeTextEntry1] : EKG - NSR - 65, JUAN A - 0.156, No acute T wave changes noted.. \par \par Labs reviewed.\par \par Patient to continue with medication as discussed.\par increase fluids\par \par Patient is medically optimized at this time and may proceed with proposed procedure.\par \par

## 2022-11-11 ENCOUNTER — APPOINTMENT (OUTPATIENT)
Dept: ORTHOPEDIC SURGERY | Facility: AMBULATORY SURGERY CENTER | Age: 72
End: 2022-11-11

## 2022-11-11 PROCEDURE — 29880 ARTHRS KNE SRG MNISECTMY M&L: CPT | Mod: AS,LT

## 2022-11-11 PROCEDURE — 20610 DRAIN/INJ JOINT/BURSA W/O US: CPT | Mod: 59,LT

## 2022-11-11 PROCEDURE — 29880 ARTHRS KNE SRG MNISECTMY M&L: CPT | Mod: LT

## 2022-11-11 RX ORDER — IBUPROFEN 800 MG/1
800 TABLET, FILM COATED ORAL 3 TIMES DAILY
Qty: 21 | Refills: 0 | Status: ACTIVE | COMMUNITY
Start: 2022-11-11 | End: 1900-01-01

## 2022-11-22 ENCOUNTER — APPOINTMENT (OUTPATIENT)
Dept: ORTHOPEDIC SURGERY | Facility: CLINIC | Age: 72
End: 2022-11-22

## 2022-11-22 VITALS — HEIGHT: 65 IN | WEIGHT: 124 LBS | BODY MASS INDEX: 20.66 KG/M2

## 2022-11-22 DIAGNOSIS — S83.282D OTHER TEAR OF LATERAL MENISCUS, CURRENT INJURY, LEFT KNEE, SUBSEQUENT ENCOUNTER: ICD-10-CM

## 2022-11-22 DIAGNOSIS — M94.262 CHONDROMALACIA, LEFT KNEE: ICD-10-CM

## 2022-11-22 DIAGNOSIS — S83.242D OTHER TEAR OF MEDIAL MENISCUS, CURRENT INJURY, LEFT KNEE, SUBSEQUENT ENCOUNTER: ICD-10-CM

## 2022-11-22 PROCEDURE — 99024 POSTOP FOLLOW-UP VISIT: CPT

## 2022-11-22 NOTE — HISTORY OF PRESENT ILLNESS
[Work related] : work related [Sudden] : sudden [6] : 6 [4] : 4 [Dull/Aching] : dull/aching [Throbbing] : throbbing [Intermittent] : intermittent [Household chores] : household chores [Leisure] : leisure [Sleep] : sleep [Social interactions] : social interactions [Rest] : rest [Not working due to injury] : Work status: not working due to injury [de-identified] : Patient is here for a post-op. Patient had left knee arthroscopy with partial medial meniscectomy, partial lateral meniscectomy and left knee CSI on 11/11/2022. Patient denies fever, chills or SOB  [] : no [FreeTextEntry1] : Left knee [FreeTextEntry3] : 7/29/2021 [FreeTextEntry5] : Patient states she was climbing stairs and tripped. Patient\par states she twisted her right knee and as she was correcting herself she twisted her left knee. [de-identified] : Movement  [de-identified] : 11/11/2022 [de-identified] : SX [de-identified] : 11/11/2022 [de-identified] : arthroscopy with partial medial meniscectomy, partial lateral meniscectomy and left knee CSI

## 2022-11-22 NOTE — PHYSICAL EXAM
[Left] : left knee [5___] : hamstring 5[unfilled]/5 [NL (0)] : extension 0 degrees [] : non-antalgic [TWNoteComboBox7] : flexion 120 degrees

## 2022-11-22 NOTE — WORK
[Torn Ligament/Tendon/Muscle] : torn ligament, tendon or muscle [Was the competent medical cause of the injury] : was the competent medical cause of the injury [Are consistent with the injury] : are consistent with the injury [Consistent with my objective findings] : consistent with my objective findings [Reveals pre-existing condition(s) that may affect treatment/prognosis] : reveals pre-existing condition(s) that may affect treatment/prognosis [Cannot return to work because ________] : cannot return to work because [unfilled] [Patient] : patient [No Rx restrictions] : No Rx restrictions. [I provided the services listed above] :  I provided the services listed above. [FreeTextEntry1] : partial [FreeTextEntry2] : knee chondromalacia [Less than Sedentary Work:] :  Less than Sedentary Work: Unable to meet the requirement of Sedentary Work.

## 2022-11-22 NOTE — ASSESSMENT
[FreeTextEntry1] : 73 yo female presenting s/p left knee arthroscopy with partial medial meniscectomy, partial lateral meniscectomy and left knee CSI on 11/11/2022. Patient denies fever, chills or SOB.\par -Discussed with patient that after she has full recovery from knee arthroscopy surgery that she may have persisting pain as she had grade III chondromalacia within trochlea and medial joint space, patient understands\par -Activities as tolerated, avoid strenuous/impact related activities at this time\par -patient unable to work at this time\par -Rest, ice, compression, elevation, NSAIDs PRN for pain. \par -All questions answered\par -F/u 1 month\par

## 2022-12-07 ENCOUNTER — APPOINTMENT (OUTPATIENT)
Dept: ORTHOPEDIC SURGERY | Facility: CLINIC | Age: 72
End: 2022-12-07

## 2022-12-19 ENCOUNTER — RESULT REVIEW (OUTPATIENT)
Age: 72
End: 2022-12-19

## 2023-01-03 ENCOUNTER — APPOINTMENT (OUTPATIENT)
Dept: ORTHOPEDIC SURGERY | Facility: CLINIC | Age: 73
End: 2023-01-03
Payer: OTHER MISCELLANEOUS

## 2023-01-03 VITALS — HEIGHT: 65 IN | WEIGHT: 124 LBS | BODY MASS INDEX: 20.66 KG/M2

## 2023-01-03 PROCEDURE — 99024 POSTOP FOLLOW-UP VISIT: CPT

## 2023-01-03 NOTE — WORK
[Torn Ligament/Tendon/Muscle] : torn ligament, tendon or muscle [Was the competent medical cause of the injury] : was the competent medical cause of the injury [Are consistent with the injury] : are consistent with the injury [Consistent with my objective findings] : consistent with my objective findings [Reveals pre-existing condition(s) that may affect treatment/prognosis] : reveals pre-existing condition(s) that may affect treatment/prognosis [Cannot return to work because ________] : cannot return to work because [unfilled] [Patient] : patient [No Rx restrictions] : No Rx restrictions. [I provided the services listed above] :  I provided the services listed above. [Less than Sedentary Work:] :  Less than Sedentary Work: Unable to meet the requirement of Sedentary Work. [FreeTextEntry1] : partial [FreeTextEntry2] : knee chondromalacia

## 2023-01-03 NOTE — HISTORY OF PRESENT ILLNESS
[Work related] : work related [Sudden] : sudden [6] : 6 [4] : 4 [Dull/Aching] : dull/aching [Throbbing] : throbbing [Intermittent] : intermittent [Household chores] : household chores [Leisure] : leisure [Sleep] : sleep [Social interactions] : social interactions [Rest] : rest [Not working due to injury] : Work status: not working due to injury [de-identified] : Patient is here for a post-op. Patient had left knee arthroscopy with partial medial meniscectomy, partial lateral meniscectomy and left knee CSI on 11/11/2022. Patient states she gets a burning and pins and needles is something rubs against her knee.  [] : no [FreeTextEntry1] : Left knee [FreeTextEntry3] : 7/29/2021 [FreeTextEntry5] : Patient states she was climbing stairs and tripped. Patient\par states she twisted her right knee and as she was correcting herself she twisted her left knee. [de-identified] : Movement  [de-identified] : 11/11/2022 [de-identified] : SX [de-identified] : 11/11/2022 [de-identified] : arthroscopy with partial medial meniscectomy, partial lateral meniscectomy and left knee CSI

## 2023-01-03 NOTE — PHYSICAL EXAM
[Left] : left knee [NL (0)] : extension 0 degrees [5___] : hamstring 5[unfilled]/5 [] : non-antalgic [TWNoteComboBox7] : flexion 120 degrees

## 2023-01-03 NOTE — ASSESSMENT
[FreeTextEntry1] : 71 yo female presenting s/p left knee arthroscopy with partial medial meniscectomy, partial lateral meniscectomy and left knee CSI on 11/11/2022. Patient reporting that she is having persisting pain. \par -Reminded patient that she had grade III chondromalacia within trochlea and medial joint space and that when pain worsens that she will be indicated for TKA, patient understands and is interested in TKA at this time\par -Discussed risks, benefits, alternative of left TKA\par -We talked about the details of the surgery, the recovery, the material risks and possible benefits and alternatives, including but not limited to infection, bleeding, need for blood transfusion, need for reoperation, stiffness, possible damage to nerves and blood vessels, failure of fixation of the components, risk of deep venous thrombosis and pulmonary embolism, wound healing problems and risk of dislocation and leg length discrepancy. We talked about bearing surface options and discussed the benefits and potential risks with each. The patient understands these risks and questions were answered. The patient understands they will need medical clearance. The patient understands they will be on anticoagulant therapy post op and the benefits and risks of.\par -Discussed with patient that TKA will be scheduled 3 months post-op s/p knee arthroscopy due to increased risk of infection prior, will plan for anytime after 2/11/22. Patient understands\par -Activities as tolerated\par -Rest, ice, compression, elevation, NSAIDs PRN for pain. \par -All questions answered\par -F/u after surgery\par \par Chamberlain Risk Score: Chance of NOT Returning Home (at discharge)	28 %;  Predicted Length of Stay: 3.5 days\par \par

## 2023-01-27 ENCOUNTER — APPOINTMENT (OUTPATIENT)
Dept: ORTHOPEDIC SURGERY | Facility: CLINIC | Age: 73
End: 2023-01-27
Payer: OTHER MISCELLANEOUS

## 2023-01-27 VITALS — HEIGHT: 65 IN | BODY MASS INDEX: 20.66 KG/M2 | WEIGHT: 124 LBS

## 2023-01-27 DIAGNOSIS — M51.26 OTHER INTERVERTEBRAL DISC DISPLACEMENT, LUMBAR REGION: ICD-10-CM

## 2023-01-27 PROCEDURE — 99215 OFFICE O/P EST HI 40 MIN: CPT | Mod: 24

## 2023-01-27 PROCEDURE — 99072 ADDL SUPL MATRL&STAF TM PHE: CPT

## 2023-01-27 NOTE — ASSESSMENT
[FreeTextEntry1] : Surgery - Laminectomy and fusion L4-5 \par \par Multilevel lumbar spondylosis \par Spondylolisthesis L4-5 with severe central stenosis \par \par Recommend: - NSAID - Heating pad - Muscle relaxer - Core strengthening exercise - Hamstring stretching exercise Patient is given back rehabilitation exercise book. \par \par Continue HEP\par \par Worker's compensation 100% temporarily disabled

## 2023-01-27 NOTE — DATA REVIEWED
[MRI] : MRI [Lumbar Spine] : lumbar spine [Report was reviewed and noted in the chart] : The report was reviewed and noted in the chart [I independently reviewed and interpreted images and report] : I independently reviewed and interpreted images and report [FreeTextEntry1] : Multilevel lumbar spondylosis \par Spondylolisthesis L4-5 with severe central stenosis

## 2023-01-27 NOTE — DISCUSSION/SUMMARY
[Surgical risks reviewed] : Surgical risks reviewed [de-identified] : Surgical risks reviewed. I discussed non operative and operative treatment options in great detail with the patient. I discussed treatment options, including but not limited to, 1. Non operative treatment - rest, NSAID, physical therapy etc. 2. Interventional treatment - injections etc. 3. Surgical treatment - Laminectomy and fusion L4-5, autograft, allograft.   Patient wants to proceed with surgical intervention after failing nonoperative care. I had a lengthy discussion with the patient about the rational and goal of the surgery as well as expected outcome. I encouraged patient to seek a second opinion regarding surgery. I explained postoperative rehabilitation and recovery process to the patient. I discussed risks, benefits and alternatives of the procedure in detail with the patient. I counseled patient about risks and possible complications, including but not limited to, infection, bleeding, nerve injury, arterial and venous injury, single or multiple muscle group weakness, dural tear, CSF leak, pseudomeningocele, arachnoiditis, CSF fistula, meningitis, discitis, osteomyelitis, epidural hematoma, DVT, PE, CRPS, MI, paralysis, pseudoarthrosis, instrumentation malposition, adjacent segment disease, persistent symptoms, and risks of anesthesia. I explained to the patient that the surgical outcome is unpredictable and there is no guarantee that the symptoms will resolve after the surgery. The patient understands and wishes to proceed. All questions were answered and patient was given information to review.

## 2023-01-27 NOTE — HISTORY OF PRESENT ILLNESS
[de-identified] : Follow up lumbar spine MRI Results at [facility]. Experiencing severe aching radiating into the right hip down the leg. Taking Motrin. Using Voltaren Gel PRN.

## 2023-02-13 ENCOUNTER — APPOINTMENT (OUTPATIENT)
Dept: FAMILY MEDICINE | Facility: CLINIC | Age: 73
End: 2023-02-13
Payer: COMMERCIAL

## 2023-02-13 VITALS
DIASTOLIC BLOOD PRESSURE: 76 MMHG | WEIGHT: 124 LBS | HEIGHT: 65 IN | RESPIRATION RATE: 15 BRPM | SYSTOLIC BLOOD PRESSURE: 138 MMHG | BODY MASS INDEX: 20.66 KG/M2

## 2023-02-13 DIAGNOSIS — F32.A DEPRESSION, UNSPECIFIED: ICD-10-CM

## 2023-02-13 DIAGNOSIS — R53.83 OTHER FATIGUE: ICD-10-CM

## 2023-02-13 PROCEDURE — 99214 OFFICE O/P EST MOD 30 MIN: CPT

## 2023-02-13 NOTE — HEALTH RISK ASSESSMENT
[3] : 2) Feeling down, depressed, or hopeless for nearly every day (3) [PHQ-2 Positive] : PHQ-2 Positive [Nearly Every Day (3)] : 4.) Feeling tired or having little energy? Nearly every day [Several Days (1)] : 6.) Feeling bad about yourself, or that you are a failure, or have let yourself or your family down? Several days [1/2 of Days or More (2)] : 7.) Trouble concentrating on things, such as reading a newspaper or watching television? Half the days or more [Not at All (0)] : 8.) Moving or speaking so slowly that other people could have noticed, or the opposite, moving or speaking faster than usual? Not at all [Moderate] : severity of depression is moderate [Somewhat Difficult] : How difficult have these problems made it for you to do your work, take care of things at home, or get along with people? Somewhat difficult [TCY9Qifet] : 6 [[id number]] : 14

## 2023-02-13 NOTE — HISTORY OF PRESENT ILLNESS
[FreeTextEntry1] : patient presents for medication renewal and bw script. also, states possible tinnitus  [de-identified] : Presenting in office for med renewal and follow up on cholesterol. She has become almost vegan recently, and discussed switching from eating eggs to eating egg whites. She wanted to check her cholesterol and see if she has had improvement. She has been feeling down lately, she has  not been working because she is home on workers comp, she has no energy and does not want to get out of bed. She used to take lexapro and stopped taking it a few years back due to weight gain, and is not sure if her depression ever went away totally but has noticed it more recently. She is wondering if she could try something else. In addition she has had a pulsating tinnitis, and feels that for the past two months she feels her heart beat in her ear, and feels it intermittently, most days she feels it some of the times.

## 2023-02-13 NOTE — PLAN
[FreeTextEntry1] : Insomnia\par okay to renew ambien ISTOP checked\par Discussed important aspects of sleep hygiene \par Establishment of a stable bedtime and wake time seven days per week\par Reduction in time in bed to approximate the total hours of estimated sleep\par Encouragement to use the bed only for sleep and sex; try to sleep only when sleepy; and get out of bed if anxiety occurs while unable to sleep\par which includes avoidance of substances that interfere with sleep, avoidance of naps to maximize sleep drive, and optimization of the comfort of the sleep environment\par \par Depression\par okay to start welbutrin 150mg daily\par i recommend she start taking b12 supplementation and fish oil\par continue with healthy life style \par \par fatigue\par will check labs as above\par she is going to start taking b12 \par \par

## 2023-02-19 ENCOUNTER — FORM ENCOUNTER (OUTPATIENT)
Age: 73
End: 2023-02-19

## 2023-02-23 ENCOUNTER — TRANSCRIPTION ENCOUNTER (OUTPATIENT)
Age: 73
End: 2023-02-23

## 2023-04-11 ENCOUNTER — APPOINTMENT (OUTPATIENT)
Dept: ORTHOPEDIC SURGERY | Facility: HOSPITAL | Age: 73
End: 2023-04-11

## 2023-05-01 ENCOUNTER — FORM ENCOUNTER (OUTPATIENT)
Age: 73
End: 2023-05-01

## 2023-05-03 ENCOUNTER — APPOINTMENT (OUTPATIENT)
Dept: ORTHOPEDIC SURGERY | Facility: CLINIC | Age: 73
End: 2023-05-03
Payer: OTHER MISCELLANEOUS

## 2023-05-03 VITALS — BODY MASS INDEX: 20.66 KG/M2 | WEIGHT: 124 LBS | HEIGHT: 65 IN

## 2023-05-03 DIAGNOSIS — M16.11 UNILATERAL PRIMARY OSTEOARTHRITIS, RIGHT HIP: ICD-10-CM

## 2023-05-03 PROCEDURE — 99215 OFFICE O/P EST HI 40 MIN: CPT

## 2023-05-03 PROCEDURE — 73503 X-RAY EXAM HIP UNI 4/> VIEWS: CPT | Mod: RT

## 2023-05-03 NOTE — HISTORY OF PRESENT ILLNESS
[de-identified] : Follow up lumbar spine. States her pain is constant, radiating into the right hip, has some groin pain, and down the leg. States she has severe pain when climbing stairs. Admits to taking Motrin for pain. Admits to using Voltaren Gel.

## 2023-05-03 NOTE — PHYSICAL EXAM
[Right] : right hip [5___] : adduction 5[unfilled]/5 [] : pain in bursa with external rotation [de-identified] : external rotation 40 degrees [TWNoteComboBox6] : internal rotation 30 degrees

## 2023-05-03 NOTE — REASON FOR VISIT
[FreeTextEntry2] : lower back pain after climbing stairs, tripping and hurt back and both knees on 7/29/21  INJURY\par

## 2023-05-03 NOTE — ASSESSMENT
[FreeTextEntry1] : Patient arranging post operative care, will do LADAN while waiting for surgery \par \par Multilevel lumbar spondylosis \par Spondylolisthesis L4-5 with severe central stenosis \par \par Recommend: - NSAID - Heating pad - Muscle relaxer - Core strengthening exercise - Hamstring stretching exercise Patient is given back rehabilitation exercise book. \par \par Patient will begin physical therapy. \par \par Worker's compensation 100% temporarily disabled \par \par Follow up in 2 months

## 2023-05-08 ENCOUNTER — TRANSCRIPTION ENCOUNTER (OUTPATIENT)
Age: 73
End: 2023-05-08

## 2023-05-23 ENCOUNTER — APPOINTMENT (OUTPATIENT)
Dept: ORTHOPEDIC SURGERY | Facility: CLINIC | Age: 73
End: 2023-05-23
Payer: OTHER MISCELLANEOUS

## 2023-05-23 VITALS — BODY MASS INDEX: 20.66 KG/M2 | WEIGHT: 124 LBS | HEIGHT: 65 IN

## 2023-05-23 PROCEDURE — 99213 OFFICE O/P EST LOW 20 MIN: CPT | Mod: 25

## 2023-05-23 PROCEDURE — 20610 DRAIN/INJ JOINT/BURSA W/O US: CPT

## 2023-05-23 PROCEDURE — 73562 X-RAY EXAM OF KNEE 3: CPT | Mod: 50

## 2023-05-23 NOTE — PROCEDURE
[Large Joint Injection] : Large joint injection [Right] : of the right [Pain] : pain [Inflammation] : inflammation [X-ray evidence of Osteoarthritis on this or prior visit] : x-ray evidence of Osteoarthritis on this or prior visit [Alcohol] : alcohol [___ cc    3mg] :  Betamethasone (Celestone) ~Vcc of 3mg [___ cc    1%] : Lidocaine ~Vcc of 1%  [Call if redness, pain or fever occur] : call if redness, pain or fever occur [Apply ice for 15min out of every hour for the next 12-24 hours as tolerated] : apply ice for 15 minutes out of every hour for the next 12-24 hours as tolerated [Previous OTC use and PT nontherapeutic] : patient has tried OTC's including aspirin, Ibuprofen, Aleve, etc or prescription NSAIDS, and/or exercises at home and/or physical therapy without satisfactory response [Patient had decreased mobility in the joint] : patient had decreased mobility in the joint [Risks, benefits, alternatives discussed / Verbal consent obtained] : the risks benefits, and alternatives have been discussed, and verbal consent was obtained

## 2023-05-23 NOTE — ASSESSMENT
[FreeTextEntry1] : 73 yo female presenting for f/u of bilateral knees. Patient is s/p left knee arthroscopy with partial medial meniscectomy, partial lateral meniscectomy and left knee CSI on 11/11/2022. WC approved left TKA but denies left knee pain today.\par -Activities as tolerated\par -Rest, ice, compression, elevation, NSAIDs PRN for pain. \par -All questions answered\par -F/u\par \par Entered by Abdoulaye Frye PA-C acting as scribe.\par Dr. Randall Attestation\par The documentation recorded by the scribe, in my presence, accurately reflects the service I, Dr. Randall, personally performed, and the decisions made by me with my edits as appropriate.\par \par \par

## 2023-05-23 NOTE — PHYSICAL EXAM
[de-identified] : Examination of the right and left knees is as follows:\par INSPECTION: \par -right knee: mild effusion, but no ecchymosis, no erythema, no atrophy, no deformities of quad tendon or of patellar tendon\par -left knee: well healed scars, mild effusion, but no ecchymosis, no erythema, no atrophy, no deformities of quad tendon or patellar tendon\par PALPATION:\par -left knee: mild tenderness to palpation over medial joint line, but no lateral joint line tenderness, no palpable mass, no obvious defects, no increased warmth, no calf tenderness\par -right knee: medial and lateral joint line tenderness, but no palpable mass, no obvious defects, no increased warmth, no calf tenderness\par ROM: flexion 125 degrees, but extension 0 degrees\par STRENGTH: quadriceps 5/5, hamstring 5/5\par TESTING: ligamentously stable\par NEURO: light touch is intact throughout\par GAIT: mildly antalgic, but patient ambulates without assistive device\par \par X-rays of the right and left knees is as follows: \par Knee 3 view in the anteroposterior, lateral, skyline views: moderate tricompartmental OA with medial joint space narrowing and varus alignment [TWNoteComboBox7] : flexion 120 degrees

## 2023-05-23 NOTE — HISTORY OF PRESENT ILLNESS
[Work related] : work related [Sudden] : sudden [6] : 6 [4] : 4 [Dull/Aching] : dull/aching [Throbbing] : throbbing [Intermittent] : intermittent [Household chores] : household chores [Leisure] : leisure [Sleep] : sleep [Social interactions] : social interactions [Rest] : rest [Not working due to injury] : Work status: not working due to injury [de-identified] : Patient is here for a follow up of bilateral knees. Patient had left knee arthroscopy with partial medial meniscectomy, partial lateral meniscectomy and left knee CSI on 11/11/2022. Patient states her right knee has been causing severe pain. Patient states she has swelling. Patient states the right knee keeps popping in and out of place. Patient states she has difficulty climbing stairs. Patient is taking Motrin for pain. Patient is using Diclofenac Gel for pain. [] : no [FreeTextEntry1] : Left knee [FreeTextEntry3] : 7/29/2021 [FreeTextEntry5] : Patient states she was climbing stairs and tripped. Patient\par states she twisted her right knee and as she was correcting herself she twisted her left knee. [de-identified] : Movement  [de-identified] : 11/11/2022 [de-identified] : SX [de-identified] : 11/11/2022 [de-identified] : arthroscopy with partial medial meniscectomy, partial lateral meniscectomy and left knee CSI

## 2023-06-05 RX ORDER — DICLOFENAC SODIUM 1% 10 MG/G
1 GEL TOPICAL DAILY
Qty: 1 | Refills: 0 | Status: ACTIVE | COMMUNITY
Start: 2023-06-05 | End: 1900-01-01

## 2023-06-22 ENCOUNTER — APPOINTMENT (OUTPATIENT)
Dept: ORTHOPEDIC SURGERY | Facility: CLINIC | Age: 73
End: 2023-06-22
Payer: OTHER MISCELLANEOUS

## 2023-06-22 DIAGNOSIS — S46.011A STRAIN OF MUSCLE(S) AND TENDON(S) OF THE ROTATOR CUFF OF RIGHT SHOULDER, INITIAL ENCOUNTER: ICD-10-CM

## 2023-06-22 PROCEDURE — 73030 X-RAY EXAM OF SHOULDER: CPT | Mod: RT

## 2023-06-22 PROCEDURE — 99214 OFFICE O/P EST MOD 30 MIN: CPT

## 2023-06-22 NOTE — ASSESSMENT
[FreeTextEntry1] : 71 yo female presenting with high-grade partial thickness tear, biceps tendinosis, mild ac djd. patient has performed HEP for extensive period of time\par -Rx PT given today\par -Activities as tolerated\par -Discussed with patient that in the future if her pain worsens that she may be indicated for right shoulder arthroscopy \par -Rest, ice, compression, elevation, NSAIDs PRN for pain. \par -All questions answered\par -F/u 6 weeks\par \par The diagnosis was explained in detail. The potential non-surgical and surgical treatments were reviewed. The relative risks and benefits of each option were considered relative to the patient’s age, activity level, medical history, symptom severity and previously attempted treatments.\par \par The patient was advised to consult with their primary medical provider prior to initiation of any new medications to reduce the risk of adverse effects specific to their long-term home medications and medical history. The risk of gastrointestinal irritation and kidney injury specific to long-term NSAID use was discussed.\par \par Entered by Abdoulaye Frye PA-C acting as scribe.\par Dr. Randall Attestation\par The documentation recorded by the scribe, in my presence, accurately reflects the service I, Dr. Randall, personally performed, and the decisions made by me with my edits as appropriate.\par

## 2023-06-22 NOTE — PHYSICAL EXAM
[de-identified] : Examination of the right shoulder is as follows: \par INSPECTION: no swelling, no ecchymosis, no erythema, no atrophy, no deformity, no scapular winging.\par PALPATION: tenderness to palpation, tenderness at lateral shoulder, bicipital groove tenderness\par ROM: active forward flexion 170 degrees, active abduction 170 degrees, internal rotation L3, external rotation 75 degrees.\par -Motion is assessed: sitting \par -Pain at end of ROM: moderate \par STRENGTH: pain with strength testing, but forward flexion 4/5, abduction 4/5, external rotation 4/5, internal rotation 4/5\par TESTS: positive impingement testing, positive Owen. \par NEURO: motor and sensory intact distally. \par \par X-rays of the right shoulder is as follows:\par Shoulder 2+ View: Mild ac djd. There are no fractures, subluxations or dislocations. No significant abnormalities are seen.

## 2023-06-22 NOTE — WORK
[Torn Ligament/Tendon/Muscle] : torn ligament, tendon or muscle [Was the competent medical cause of the injury] : was the competent medical cause of the injury [Are consistent with the injury] : are consistent with the injury [Consistent with my objective findings] : consistent with my objective findings [Does not reveal pre-existing condition(s) that may affect treatment/prognosis] : does not reveal pre-existing condition(s) that may affect treatment/prognosis [Can return to work without limitations on ______] : can return to work without limitations on [unfilled] [Patient] : patient [No Rx restrictions] : No Rx restrictions. [I provided the services listed above] :  I provided the services listed above. [Very Heavy Work:] : Very Heavy Work: Exerting in excess of 100 pounds of force occasionally, and/or in excess of 50 pounds of force frequently, and/or in excess of 20 pounds of force constantly to move objects. Physical demand requirements are in excess of those for Heavy Work

## 2023-06-22 NOTE — HISTORY OF PRESENT ILLNESS
[Work related] : work related [Gradual] : gradual [8] : 8 [4] : 4 [Dull/Aching] : dull/aching [Sharp] : sharp [Throbbing] : throbbing [Intermittent] : intermittent [Household chores] : household chores [Leisure] : leisure [Social interactions] : social interactions [Rest] : rest [de-identified] : Patient is here for her right shoulder. Patient states she had repetitive motion injury 9/6/2019. Patient states after she had wrist SX that the shoulder was accepted onto the WC claim. Patient states she has sharp, throbbing and aching pain with ROM. Patient had MRI at [facility] on 1/23/23. Patient states she has been doing a HEP with no relief. \par \par IMPRESSION:\par \par \par High-grade partial-thickness tear at the supraspinatus tendon insertion\par extending to the bursal surface with resultant moderate subacromial subdeltoid\par bursitis.\par \par Mild AC joint arthrosis.\par \par Tendinosis of the intra-articular long head of the biceps tendon. [] : Post Surgical Visit: no [FreeTextEntry1] : Right shoulder [FreeTextEntry3] : 9/6/2019 [FreeTextEntry5] : Patient states she had repetitive motion injury  [de-identified] : Movement

## 2023-06-29 ENCOUNTER — APPOINTMENT (OUTPATIENT)
Dept: PAIN MANAGEMENT | Facility: CLINIC | Age: 73
End: 2023-06-29
Payer: OTHER MISCELLANEOUS

## 2023-06-29 VITALS — HEIGHT: 65 IN | WEIGHT: 128 LBS | BODY MASS INDEX: 21.33 KG/M2

## 2023-06-29 PROCEDURE — 99214 OFFICE O/P EST MOD 30 MIN: CPT

## 2023-06-30 NOTE — PHYSICAL EXAM
[de-identified] : Constitutional:  \par - No acute distress  \par - Well developed; well nourished  \par \par Neurological:  \par - normal mood and affect  \par - alert and oriented x 3   \par \par Cardiovascular:  \par - grossly normal \par \par Lumbar Spine Exam: \par \par Inspection: \par erythema (-) \par ecchymosis (-) \par rashes (-) \par alignment: no scoliosis \par \par Palpation: \par Midline lumbar tenderness:            (-) \par midline thoracic tenderness:          (-) \par Lumbar paraspinal tenderness:  L (-) ; R (-) \par thoracic paraspinal tenderness: L (-) ; R (-) \par sciatic nerve tenderness :          L (-) ; R (-) \par SI joint tenderness:                     L (-) ; R (-) \par GTB tenderness:                        L (-);  R (+) \par \par ROM: reduced with stiffness\par pain with extension and flexion \par \par Strength: \par                                    Right       Left    \par Hip Flexion:                (5/5)       (5/5) \par Quadriceps:               (5/5)       (5/5) \par Hamstrings:                (5/5)       (5/5) \par Ankle Dorsiflexion:     (5/5)       (5/5) \par EHL:                           (5/5)       (5/5) \par Ankle Plantarflexion:  (5/5)       (5/5) \par \par Special Tests: \par SLR:                            R (+) ; L (-) \par Facet loading:             R (+) ; L (+) \par SHAUN test:                R (-) ; L (-) \par Hamstring tightness:   R (-);  L (-) \par \par Neurologic: \par SILT throughout right lower extremity \par SILT throughout left lower extremity \par \par Reflexes normal and symmetric bilateral lower extremities \par \par Gait: \par non- antalgic gait \par ambulates without assistive device

## 2023-06-30 NOTE — ASSESSMENT
[FreeTextEntry1] : A thorough discussion occurred regarding available pain management treatment options including interventional,\par rehabilitative, pharmacological, and alternative modalities with the patient. We will proceed with the following:\par \par Interventional treatment options:\par - Proceed with right PM L5-S1 LESI (80mg Kenalog) with fluoroscopic guidance\par - If inadequate relief of axial lower back pain would likely consider facet directed intervention\par - see additional instructions below\par \par Rehabilitative options:\par - completed physical therapy trials \par - encouraged active participation in HEP as tolerated\par \par Medication based treatment options:\par - continue Voltaren gel up to QID as needed\par - continue Ibuprofen 800 mg up to TID as needed\par - Caution with ongoing NSAID use\par - see additional instructions below\par \par Complementary treatment options:\par - lifestyle modifications discussed\par \par Additional treatment recommendations as follows:\par - Follow-up with Dr. Murphy as directed\par - Followup 1-2 weeks post injection for assessment of efficacy and further recommendations.\par \par We have discussed the risks, benefits, and alternatives NSAID therapy including but not limited to the risk of bleeding, thrombosis, gastric mucosal irritation/ulceration, allergic reaction and kidney dysfunction; the patient verbalizes an understanding.\par \par The risks, benefits and alternatives of the proposed procedure were explained in detail with the patient.  The risks outlined include, but are not limited to, infection, bleeding, nerve injury, post dural headache, a temporary increase in pain, failure to resolve symptoms, allergic reaction, and possible elevation of blood sugar in diabetics if using corticosteroid.  All questions were answered to patient's apparent satisfaction and he/she verbalized an understanding.\par \par Darin MATSON, personally performed the services described in this documentation incident to Madhu Bacon DO.\par \par The documentation recorded by the scribe, in my presence, accurately reflects the service I personally performed, and the decisions made by me with my edits as appropriate.

## 2023-06-30 NOTE — WORK
[Total (100%)] : total (100%) [Patient] : patient [No Rx restrictions] : No Rx restrictions. [I provided the services listed above] :  I provided the services listed above. [FreeTextEntry1] : guarded [FreeTextEntry3] : Degree of impairment above with regard to lumbar spine only

## 2023-06-30 NOTE — HISTORY OF PRESENT ILLNESS
[Lower back] : lower back [Right Leg] : right leg [Sudden] : sudden [6] : 6 [Dull/Aching] : dull/aching [Intermittent] : intermittent [Household chores] : household chores [Leisure] : leisure [Social interactions] : social interactions [Injection therapy] : injection therapy [FreeTextEntry1] : 6/29/2023 - Patient presents for re-evaluation.  She was last seen approximately 1 year ago when she underwent an L5-S1 LESI on 5/6/2022.  Patient referred by Dr. Murphy for interventional tx.  Patient primary complaint at this time is bilateral axial lower back pain with right sided radicular pain with radiation to the top of her foot.  She reports gaining authorization of laminectomy and fusion with Dr. Murphy but would like to try LADAN prior.  She is currently taking Ibuprofen for pain control.   \par \par 3/14/22 - Patient presents for a workers comp FUV following an left L4-L5, L5-S1 TFESI on 2/18/22. Patient reports 50% improvement with residual lower back pain radiating to bilateral lower extremities L > R. Patient reports the pain stops at the left knee. Patient reports 80/20 back versus leg pain. Patient reports no side effects post injection. Patient has reported resolution of her groin pain. Patient reports she has completed PT and has started HEP. Feels she is incapable of performing duties associated with her job which involves significant travel. \par \par 02/07/2022- Patient presents for a FUV. Patient reports ongoing low back pain with radiation to left lower extremity. She reports the distribution of pain has 60% low back 40% left leg. Reports resolution of her groin pain which was a complaint at her initial visit. She has undergone surgical intervention on the left knee and is currently recovering. Still out of work. \par \par 11/1/21 - Patient presents for initial evaluation. Patient reports that on 07/29/21 she tripped up a flight of stairs while at a physicians office. Patient has started PT which she finds helpful in temporarily reducing her symptoms. Patient c/o lower back pain radiating into her left leg. Patient c/o bilateral foot paresthesia's. Patient reports that prolonged walking exacerbates a separate groin pain. Patient reports her pain is worsened with sitting, standing and walking.\par \par WC DOI: 7/29/21\par Occupation: RN\par Work status: OOW\par \par \par Previous Injections: \par 1) Left L4-L5, L5-S1 TFESI (2/18/22)\par 2) L5-S1 LESI (5/6/2022)\par \par Pertinent Surgical History: N/A\par \par Imaging:\par 1) Lumbar Spine MRI (10/18/2022) - [facility]\par \par L1-2: There is no disc bulge, herniation, thecal sac compression or foraminal narrowing.\par L2-3: There is diffuse disc bulge impressing upon the ventral thecal sac. There is bilateral facet arthropathy. There is mild spinal canal stenosis and mild bilateral foraminal narrowing.\par L3-4: There is diffuse disc bulge impressing upon the ventral thecal sac. There is severe bilateral facet arthropathy. There is mild spinal canal stenosis and mild to moderate bilateral foraminal narrowing.\par L4-5: There is anterolisthesis with uncovering disc material and superimposed disc bulge eccentric to the right. There is left foraminal prominent endplate osteophytes. There is severe bilateral facet arthropathy. There is moderate left and severe right foraminal narrowing and impingement of exiting both L4 nerve roots. There is moderate to severe spinal canal stenosis and impingement of right greater than left descending L5 nerve roots.\par L5-S1: There is diffuse disc bulge impressing upon the ventral thecal sac. There is severe bilateral facet arthropathy. There is moderate left foraminal narrowing and impingement of exiting left L5 nerve root. There is mild to moderate right foraminal narrowing. There is bilateral lateral recess narrowing and indentation upon the descending both S1 nerve roots.\par \par Physician Disclaimer: I have personally reviewed and confirmed all HPI data with the patient [] : no [FreeTextEntry7] : RIGHT LEG [FreeTextEntry8] : WALKING

## 2023-07-06 ENCOUNTER — APPOINTMENT (OUTPATIENT)
Dept: ORTHOPEDIC SURGERY | Facility: CLINIC | Age: 73
End: 2023-07-06
Payer: OTHER MISCELLANEOUS

## 2023-07-06 DIAGNOSIS — M17.0 BILATERAL PRIMARY OSTEOARTHRITIS OF KNEE: ICD-10-CM

## 2023-07-06 PROCEDURE — 99214 OFFICE O/P EST MOD 30 MIN: CPT

## 2023-07-06 NOTE — WORK
[Torn Ligament/Tendon/Muscle] : torn ligament, tendon or muscle [Was the competent medical cause of the injury] : was the competent medical cause of the injury [Are consistent with the injury] : are consistent with the injury [Consistent with my objective findings] : consistent with my objective findings [Reveals pre-existing condition(s) that may affect treatment/prognosis] : reveals pre-existing condition(s) that may affect treatment/prognosis [Cannot return to work because ________] : cannot return to work because [unfilled] [Patient] : patient [No Rx restrictions] : No Rx restrictions. [I provided the services listed above] :  I provided the services listed above. [Very Heavy Work:] : Very Heavy Work: Exerting in excess of 100 pounds of force occasionally, and/or in excess of 50 pounds of force frequently, and/or in excess of 20 pounds of force constantly to move objects. Physical demand requirements are in excess of those for Heavy Work [FreeTextEntry1] : partial [FreeTextEntry2] : knee chondromalacia

## 2023-07-06 NOTE — HISTORY OF PRESENT ILLNESS
[Work related] : work related [Sudden] : sudden [6] : 6 [4] : 4 [Dull/Aching] : dull/aching [Throbbing] : throbbing [Intermittent] : intermittent [Household chores] : household chores [Leisure] : leisure [Sleep] : sleep [Social interactions] : social interactions [Rest] : rest [Not working due to injury] : Work status: not working due to injury [de-identified] : Patient is here for a follow up of bilateral knees. Patient reports that right knee is worse than the left. Patient had left knee arthroscopy with partial medial meniscectomy, partial lateral meniscectomy and left knee CSI on 11/11/2022. Patient had right knee arthroscopy with partial medial and lateral meniscectomies and shaving chondroplasty on 1/19/22.  Patient had right knee CSI on 5/23/23. Patient reports that the injection helped for about three days and then pain recurred. Patient was then Rx Medrol dose [place] on 6/5/23 which only provided her temporary relief.  Patient states her right knee has been causing severe pain. Patient states she has swelling. Patient states the right knee keeps popping in and out of place. Patient states she has difficulty climbing stairs. Patient is taking Motrin and uses diclofenac cream with only some improvement in her pain. [] : no [FreeTextEntry1] : Left knee [FreeTextEntry3] : 7/29/2021 [FreeTextEntry5] : Patient states she was climbing stairs and tripped. Patient\par states she twisted her right knee and as she was correcting herself she twisted her left knee. [de-identified] : Movement  [de-identified] : 11/11/2022 [de-identified] : SX [de-identified] : 11/11/2022 [de-identified] : arthroscopy with partial medial meniscectomy, partial lateral meniscectomy and left knee CSI

## 2023-07-06 NOTE — PHYSICAL EXAM
[de-identified] : Examination of the right and left knees is as follows:\par INSPECTION: \par -right knee: mild effusion, but no ecchymosis, no erythema, no atrophy, no deformities of quad tendon or of patellar tendon\par -left knee: well healed scars, mild effusion, but no ecchymosis, no erythema, no atrophy, no deformities of quad tendon or patellar tendon\par PALPATION:\par -left knee: mild tenderness to palpation over medial joint line, but no lateral joint line tenderness, no palpable mass, no obvious defects, no increased warmth, no calf tenderness\par -right knee: medial and lateral joint line tenderness, but no palpable mass, no obvious defects, no increased warmth, no calf tenderness\par ROM: flexion 125 degrees, but extension 0 degrees, right knee anterior and posterior pain with flexion\par STRENGTH: quadriceps 5/5, hamstring 5/5\par TESTING: ligamentously stable\par NEURO: light touch is intact throughout\par GAIT: mildly antalgic, but patient ambulates without assistive device

## 2023-07-11 ENCOUNTER — FORM ENCOUNTER (OUTPATIENT)
Age: 73
End: 2023-07-11

## 2023-07-13 ENCOUNTER — APPOINTMENT (OUTPATIENT)
Dept: ORTHOPEDIC SURGERY | Facility: CLINIC | Age: 73
End: 2023-07-13

## 2023-07-21 ENCOUNTER — FORM ENCOUNTER (OUTPATIENT)
Age: 73
End: 2023-07-21

## 2023-07-25 ENCOUNTER — FORM ENCOUNTER (OUTPATIENT)
Age: 73
End: 2023-07-25

## 2023-07-27 ENCOUNTER — FORM ENCOUNTER (OUTPATIENT)
Age: 73
End: 2023-07-27

## 2023-08-01 ENCOUNTER — NON-APPOINTMENT (OUTPATIENT)
Age: 73
End: 2023-08-01

## 2023-08-21 ENCOUNTER — RX RENEWAL (OUTPATIENT)
Age: 73
End: 2023-08-21

## 2023-08-24 ENCOUNTER — APPOINTMENT (OUTPATIENT)
Dept: ORTHOPEDIC SURGERY | Facility: CLINIC | Age: 73
End: 2023-08-24

## 2023-08-24 ENCOUNTER — RESULT REVIEW (OUTPATIENT)
Age: 73
End: 2023-08-24

## 2023-08-31 ENCOUNTER — APPOINTMENT (OUTPATIENT)
Dept: ORTHOPEDIC SURGERY | Facility: CLINIC | Age: 73
End: 2023-08-31
Payer: OTHER MISCELLANEOUS

## 2023-08-31 PROCEDURE — 99213 OFFICE O/P EST LOW 20 MIN: CPT

## 2023-08-31 NOTE — HISTORY OF PRESENT ILLNESS
[Work related] : work related [Gradual] : gradual [6] : 6 [3] : 3 [Burning] : burning [Dull/Aching] : dull/aching [Sharp] : sharp [Throbbing] : throbbing [Constant] : constant [Household chores] : household chores [Leisure] : leisure [Social interactions] : social interactions [Rest] : rest [Not working due to injury] : Work status: not working due to injury [de-identified] : Patient is here for a follow up for right shoulder. Patient is being treated for high-grade partial thickness tear, biceps tendinosis, mild ac djd. Patient was prescribed Medrol on 8/21/2023. Patient states the Medrol dose pack helped to minimize the pain, but she still has aching and burning pain with ROM.  Patient was recommended physical therapy at last office visit but was denied by workers comp.  Continues to have persistent pain with activities of daily living. [] : Post Surgical Visit: no [FreeTextEntry1] : Right shoulder [FreeTextEntry3] : 9/6/2019 [FreeTextEntry5] : Patient states she had repetitive motion injury.   [de-identified] : Movement

## 2023-08-31 NOTE — PHYSICAL EXAM
[Right] : right shoulder [4 ___] : forward flexion 4[unfilled]/5 [4___] : internal rotation 4[unfilled]/5 [] : motor and sensory intact distally [TWNoteComboBox7] : active forward flexion 130 degrees [de-identified] : active abduction 90 degrees [TWNoteComboBox6] : internal rotation L4 [de-identified] : external rotation 60 degrees

## 2023-08-31 NOTE — ASSESSMENT
[FreeTextEntry1] : 74yo RHDF with right shoudler partial thickness rotator cuff tear, biceps tendonitis, impingement.  Patient was denied PT by workers comp.  Patient would like to contineu nonsurgical management.  Will resubmit request for PT for right shoulder.    -Activities as tolerated -Rest, ice, compression, elevation, NSAIDs PRN for pain.  -All questions answered -F/u 6 weeks after PT  The diagnosis was explained in detail. The potential non-surgical and surgical treatments were reviewed. The relative risks and benefits of each option were considered relative to the patients age, activity level, medical history, symptom severity and previously attempted treatments.  The patient was advised to consult with their primary medical provider prior to initiation of any new medications to reduce the risk of adverse effects specific to their long-term home medications and medical history. The risk of gastrointestinal irritation and kidney injury specific to long-term NSAID use was discussed.  Detail Level: Detailed Depth Of Biopsy: dermis Was A Bandage Applied: Yes Size Of Lesion In Cm: 0.4 X Size Of Lesion In Cm: 0.6 Biopsy Type: H and E Biopsy Method: Personna blade Anesthesia Type: 2% lidocaine with epinephrine Anesthesia Volume In Cc (Will Not Render If 0): 0.5 Additional Anesthesia Volume In Cc (Will Not Render If 0): 0 Hemostasis: Aluminum Chloride and Electrocautery Wound Care: Bacitracin Dressing: Band-Aid Destruction After The Procedure: No Type Of Destruction Used: Curettage Curettage Text: NA Cryotherapy Text: The wound bed was treated with cryotherapy after the biopsy was performed. Electrodesiccation Text: The wound bed was treated with electrodesiccation after the biopsy was performed. Electrodesiccation And Curettage Text: The wound bed was treated with electrodesiccation and curettage after the biopsy was performed. Silver Nitrate Text: The wound bed was treated with silver nitrate after the biopsy was performed. Lab: [id number] Lab Facility: [id number] Consent: Verbal consent was obtained and risks were reviewed including but not limited to scarring, infection, bleeding, scabbing, incomplete removal, nerve damage and allergy to anesthesia. Post-Care Instructions: I reviewed with the patient in detail post-care instructions. Patient is to keep the biopsy site dry overnight, and then apply bacitracin twice daily until healed. Patient may apply hydrogen peroxide soaks to remove any crusting. Notification Instructions: Patient will be notified of biopsy results. However, patient instructed to call the office if not contacted within 2 weeks. Billing Type: Third-Party Bill Information: Selecting Yes will display possible errors in your note based on the variables you have selected. This validation is only offered as a suggestion for you. PLEASE NOTE THAT THE VALIDATION TEXT WILL BE REMOVED WHEN YOU FINALIZE YOUR NOTE. IF YOU WANT TO FAX A PRELIMINARY NOTE YOU WILL NEED TO TOGGLE THIS TO 'NO' IF YOU DO NOT WANT IT IN YOUR FAXED NOTE.

## 2023-09-01 ENCOUNTER — TRANSCRIPTION ENCOUNTER (OUTPATIENT)
Age: 73
End: 2023-09-01

## 2023-09-05 ENCOUNTER — RX RENEWAL (OUTPATIENT)
Age: 73
End: 2023-09-05

## 2023-10-12 ENCOUNTER — APPOINTMENT (OUTPATIENT)
Dept: PAIN MANAGEMENT | Facility: CLINIC | Age: 73
End: 2023-10-12
Payer: OTHER MISCELLANEOUS

## 2023-10-12 VITALS — WEIGHT: 129 LBS | BODY MASS INDEX: 21.49 KG/M2 | HEIGHT: 65 IN

## 2023-10-12 PROCEDURE — 99214 OFFICE O/P EST MOD 30 MIN: CPT

## 2023-10-13 ENCOUNTER — APPOINTMENT (OUTPATIENT)
Dept: ORTHOPEDIC SURGERY | Facility: AMBULATORY SURGERY CENTER | Age: 73
End: 2023-10-13

## 2023-10-13 RX ORDER — METHYLPREDNISOLONE 4 MG/1
4 TABLET ORAL
Qty: 1 | Refills: 0 | Status: DISCONTINUED | COMMUNITY
Start: 2023-06-05 | End: 2023-10-13

## 2023-10-13 RX ORDER — DICLOFENAC SODIUM 1% 10 MG/G
1 GEL TOPICAL 4 TIMES DAILY
Qty: 1 | Refills: 0 | Status: DISCONTINUED | COMMUNITY
Start: 2023-05-03 | End: 2023-10-13

## 2023-10-13 RX ORDER — METHYLPREDNISOLONE 4 MG/1
4 TABLET ORAL
Qty: 1 | Refills: 0 | Status: DISCONTINUED | COMMUNITY
Start: 2023-08-21 | End: 2023-10-13

## 2023-10-13 RX ORDER — DICLOFENAC SODIUM 1% 10 MG/G
1 GEL TOPICAL 4 TIMES DAILY
Qty: 1 | Refills: 2 | Status: DISCONTINUED | COMMUNITY
Start: 2023-05-09 | End: 2023-10-13

## 2023-10-13 RX ORDER — DICLOFENAC SODIUM 1% 10 MG/G
1 GEL TOPICAL
Refills: 0 | Status: DISCONTINUED | COMMUNITY
End: 2023-10-13

## 2023-10-16 ENCOUNTER — TRANSCRIPTION ENCOUNTER (OUTPATIENT)
Age: 73
End: 2023-10-16

## 2023-10-16 ENCOUNTER — APPOINTMENT (OUTPATIENT)
Dept: PHYSICAL MEDICINE AND REHAB | Facility: CLINIC | Age: 73
End: 2023-10-16
Payer: OTHER MISCELLANEOUS

## 2023-10-16 VITALS
RESPIRATION RATE: 16 BRPM | BODY MASS INDEX: 21.49 KG/M2 | WEIGHT: 129 LBS | SYSTOLIC BLOOD PRESSURE: 124 MMHG | DIASTOLIC BLOOD PRESSURE: 68 MMHG | HEART RATE: 77 BPM | HEIGHT: 65 IN | TEMPERATURE: 97.7 F | OXYGEN SATURATION: 98 %

## 2023-10-16 DIAGNOSIS — E78.5 HYPERLIPIDEMIA, UNSPECIFIED: ICD-10-CM

## 2023-10-16 DIAGNOSIS — E78.00 PURE HYPERCHOLESTEROLEMIA, UNSPECIFIED: ICD-10-CM

## 2023-10-16 DIAGNOSIS — E04.1 NONTOXIC SINGLE THYROID NODULE: ICD-10-CM

## 2023-10-16 DIAGNOSIS — M19.011 PRIMARY OSTEOARTHRITIS, RIGHT SHOULDER: ICD-10-CM

## 2023-10-16 DIAGNOSIS — Z01.818 ENCOUNTER FOR OTHER PREPROCEDURAL EXAMINATION: ICD-10-CM

## 2023-10-16 DIAGNOSIS — M85.80 OTHER SPECIFIED DISORDERS OF BONE DENSITY AND STRUCTURE, UNSPECIFIED SITE: ICD-10-CM

## 2023-10-16 DIAGNOSIS — E04.9 NONTOXIC GOITER, UNSPECIFIED: ICD-10-CM

## 2023-10-16 PROCEDURE — 99214 OFFICE O/P EST MOD 30 MIN: CPT | Mod: ACP,25

## 2023-10-16 PROCEDURE — 93000 ELECTROCARDIOGRAM COMPLETE: CPT

## 2023-10-17 ENCOUNTER — APPOINTMENT (OUTPATIENT)
Dept: ORTHOPEDIC SURGERY | Facility: CLINIC | Age: 73
End: 2023-10-17
Payer: OTHER MISCELLANEOUS

## 2023-10-17 DIAGNOSIS — S46.011D STRAIN OF MUSCLE(S) AND TENDON(S) OF THE ROTATOR CUFF OF RIGHT SHOULDER, SUBSEQUENT ENCOUNTER: ICD-10-CM

## 2023-10-17 PROCEDURE — 99213 OFFICE O/P EST LOW 20 MIN: CPT

## 2023-10-19 ENCOUNTER — TRANSCRIPTION ENCOUNTER (OUTPATIENT)
Age: 73
End: 2023-10-19

## 2023-10-23 ENCOUNTER — APPOINTMENT (OUTPATIENT)
Dept: ORTHOPEDIC SURGERY | Facility: HOSPITAL | Age: 73
End: 2023-10-23
Payer: OTHER MISCELLANEOUS

## 2023-10-23 PROCEDURE — 27447 TOTAL KNEE ARTHROPLASTY: CPT | Mod: AS,RT

## 2023-10-23 PROCEDURE — 27447 TOTAL KNEE ARTHROPLASTY: CPT | Mod: RT

## 2023-11-03 ENCOUNTER — APPOINTMENT (OUTPATIENT)
Dept: ORTHOPEDIC SURGERY | Facility: CLINIC | Age: 73
End: 2023-11-03
Payer: OTHER MISCELLANEOUS

## 2023-11-03 DIAGNOSIS — M17.11 UNILATERAL PRIMARY OSTEOARTHRITIS, RIGHT KNEE: ICD-10-CM

## 2023-11-03 PROCEDURE — 73562 X-RAY EXAM OF KNEE 3: CPT | Mod: RT

## 2023-11-03 PROCEDURE — 99024 POSTOP FOLLOW-UP VISIT: CPT

## 2023-12-01 ENCOUNTER — APPOINTMENT (OUTPATIENT)
Dept: ORTHOPEDIC SURGERY | Facility: CLINIC | Age: 73
End: 2023-12-01
Payer: OTHER MISCELLANEOUS

## 2023-12-01 PROCEDURE — 99024 POSTOP FOLLOW-UP VISIT: CPT

## 2023-12-01 PROCEDURE — 73562 X-RAY EXAM OF KNEE 3: CPT | Mod: RT

## 2023-12-01 RX ORDER — ZOLPIDEM TARTRATE 5 MG/1
5 TABLET ORAL
Qty: 30 | Refills: 0 | Status: ACTIVE | COMMUNITY
Start: 2023-12-01 | End: 1900-01-01

## 2024-01-09 ENCOUNTER — APPOINTMENT (OUTPATIENT)
Dept: ORTHOPEDIC SURGERY | Facility: CLINIC | Age: 74
End: 2024-01-09
Payer: OTHER MISCELLANEOUS

## 2024-01-09 PROCEDURE — 99024 POSTOP FOLLOW-UP VISIT: CPT

## 2024-01-09 PROCEDURE — 73562 X-RAY EXAM OF KNEE 3: CPT | Mod: RT

## 2024-01-23 ENCOUNTER — APPOINTMENT (OUTPATIENT)
Dept: ORTHOPEDIC SURGERY | Facility: CLINIC | Age: 74
End: 2024-01-23

## 2024-01-23 NOTE — WORK
[Total (100%)] : total (100%) [Patient] : patient [No Rx restrictions] : No Rx restrictions. [I provided the services listed above] :  I provided the services listed above. [FreeTextEntry3] : Degree of impairment above with regard to lumbar spine only

## 2024-01-23 NOTE — ASSESSMENT
[FreeTextEntry1] : A thorough discussion occurred regarding available pain management treatment options including interventional, rehabilitative, pharmacological, and alternative modalities with the patient. We will proceed with the following:  Interventional treatment options: - Proceed with right PM L5-S1 LESI (80 mg Kenalog) with fluoroscopic guidance - If inadequate relief of axial lower back pain would likely consider facet directed intervention - Discussed appropriate timing for Indicated procedure given upcoming TKR - see additional instructions below  Rehabilitative options: - completed prior physical therapy trials - encouraged active participation in HEP as tolerated  Medication based treatment options: - continue Voltaren gel up to QID as needed - continue Ibuprofen 600-800 mg up to TID as needed - start gabapentin 300 TID; titration schedule provided - see additional instructions below  Complementary treatment options: - lifestyle modifications discussed  Additional treatment recommendations as follows: - Follow-up with Dr. Murphy as directed - Follow-up for indicated procedure or 6 weeks  We have discussed the risks, benefits, and alternatives NSAID therapy including but not limited to the risk of bleeding, thrombosis, gastric mucosal irritation/ulceration, allergic reaction and kidney dysfunction; the patient verbalizes an understanding.  The risks, benefits and alternatives of the proposed procedure were explained in detail with the patient. The risks outlined include, but are not limited to, infection, bleeding, nerve injury, post dural headache, a temporary increase in pain, failure to resolve symptoms, allergic reaction, and possible elevation of blood sugar in diabetics if using corticosteroid. All questions were answered to patient's apparent satisfaction and he/she verbalized an understanding.  I, Edgardo Hernandez acting as scribe, attest that this documentation has been prepared under the direction and in the presence of Provider Madhu Bacon DO.   The documentation recorded by the scribe, in my presence, accurately reflects the service I personally performed, and the decisions made by me with my edits as appropriate.

## 2024-01-23 NOTE — PHYSICAL EXAM
[de-identified] : Constitutional:   - No acute distress   - Well developed; well nourished    Neurological:   - normal mood and affect   - alert and oriented x 3     Cardiovascular:   - grossly normal   Lumbar Spine Exam:   Inspection:  erythema (-)  ecchymosis (-)  rashes (-)  alignment: no scoliosis   Palpation:  Midline lumbar tenderness:            (-)  midline thoracic tenderness:          (-)  Lumbar paraspinal tenderness:  L (-) ; R (-)  thoracic paraspinal tenderness: L (-) ; R (-)  sciatic nerve tenderness :          L (-) ; R (-)  SI joint tenderness:                     L (-) ; R (-)  GTB tenderness:                        L (-);  R (+)   ROM: reduced with stiffness pain with extension and flexion   Strength:                                     Right       Left     Hip Flexion:                (5/5)       (5/5)  Quadriceps:               (5/5)       (5/5)  Hamstrings:                (5/5)       (5/5)  Ankle Dorsiflexion:     (5/5)       (5/5)  EHL:                           (5/5)       (5/5)  Ankle Plantarflexion:  (5/5)       (5/5)   Special Tests:  SLR:                            R (+) ; L (-)  Facet loading:             R (+) ; L (+)  SHAUN test:                R (-) ; L (-)  Hamstring tightness:   R (-);  L (-)   Neurologic:  SILT throughout right lower extremity  SILT throughout left lower extremity   Reflexes normal and symmetric bilateral lower extremities   Gait:  Mildly antalgic gait

## 2024-01-25 ENCOUNTER — APPOINTMENT (OUTPATIENT)
Dept: PAIN MANAGEMENT | Facility: CLINIC | Age: 74
End: 2024-01-25
Payer: OTHER MISCELLANEOUS

## 2024-01-25 VITALS — HEIGHT: 65 IN | WEIGHT: 130 LBS | BODY MASS INDEX: 21.66 KG/M2

## 2024-01-25 DIAGNOSIS — M47.816 SPONDYLOSIS W/OUT MYELOPATHY OR RADICULOPATHY, LUMBAR REGION: ICD-10-CM

## 2024-01-25 DIAGNOSIS — M48.062 SPINAL STENOSIS, LUMBAR REGION WITH NEUROGENIC CLAUDICATION: ICD-10-CM

## 2024-01-25 PROCEDURE — 99214 OFFICE O/P EST MOD 30 MIN: CPT

## 2024-01-29 ENCOUNTER — TRANSCRIPTION ENCOUNTER (OUTPATIENT)
Age: 74
End: 2024-01-29

## 2024-02-05 ENCOUNTER — RX CHANGE (OUTPATIENT)
Age: 74
End: 2024-02-05

## 2024-02-07 ENCOUNTER — TRANSCRIPTION ENCOUNTER (OUTPATIENT)
Age: 74
End: 2024-02-07

## 2024-02-15 ENCOUNTER — APPOINTMENT (OUTPATIENT)
Dept: ORTHOPEDIC SURGERY | Facility: CLINIC | Age: 74
End: 2024-02-15
Payer: OTHER MISCELLANEOUS

## 2024-02-15 VITALS — BODY MASS INDEX: 21.66 KG/M2 | WEIGHT: 130 LBS | HEIGHT: 65 IN

## 2024-02-15 DIAGNOSIS — M17.12 UNILATERAL PRIMARY OSTEOARTHRITIS, LEFT KNEE: ICD-10-CM

## 2024-02-15 PROCEDURE — 99215 OFFICE O/P EST HI 40 MIN: CPT

## 2024-02-15 PROCEDURE — 73562 X-RAY EXAM OF KNEE 3: CPT | Mod: LT

## 2024-02-15 NOTE — PHYSICAL EXAM
[de-identified] : Examination of the right and left knees is as follows:\par  INSPECTION: \par  -right knee: mild effusion, but no ecchymosis, no erythema, no atrophy, no deformities of quad tendon or of patellar tendon\par  -left knee: well healed scars, mild effusion, but no ecchymosis, no erythema, no atrophy, no deformities of quad tendon or patellar tendon\par  PALPATION:\par  -left knee: mild tenderness to palpation over medial joint line, but no lateral joint line tenderness, no palpable mass, no obvious defects, no increased warmth, no calf tenderness\par  -right knee: medial and lateral joint line tenderness, but no palpable mass, no obvious defects, no increased warmth, no calf tenderness\par  ROM: flexion 125 degrees, but extension 0 degrees, right knee anterior and posterior pain with flexion\par  STRENGTH: quadriceps 5/5, hamstring 5/5\par  TESTING: ligamentously stable\par  NEURO: light touch is intact throughout\par  GAIT: mildly antalgic, but patient ambulates without assistive device

## 2024-02-15 NOTE — HISTORY OF PRESENT ILLNESS
[Work related] : work related [Sudden] : sudden [6] : 6 [Dull/Aching] : dull/aching [Throbbing] : throbbing [Intermittent] : intermittent [Household chores] : household chores [Leisure] : leisure [Sleep] : sleep [Social interactions] : social interactions [Rest] : rest [Not working due to injury] : Work status: not working due to injury [2] : 2 [Stabbing] : stabbing [de-identified] : Patient presents today in office following up on her LT knee.  DOI:07-. States her LT knee feels worse now due to compensating for her RT. States her LT knee has been buckling and having instability.  [] : no [FreeTextEntry1] : Left knee [FreeTextEntry3] : 7/29/2021 [FreeTextEntry5] : Patient states she was climbing stairs and tripped. Patient\par  states she twisted her right knee and as she was correcting herself she twisted her left knee. [de-identified] : Movement  [de-identified] : 11/11/2022 [de-identified] : SX [de-identified] : 11/11/2022 [de-identified] : arthroscopy with partial medial meniscectomy, partial lateral meniscectomy and left knee CSI

## 2024-02-15 NOTE — ASSESSMENT
[FreeTextEntry1] : 73 yo female presenting for f/u of bilateral knees. Patient is s/p left knee arthroscopy with partial medial meniscectomy, partial lateral meniscectomy and left knee CSI on 11/11/2022. Patient had right knee arthroscopy with partial medial and lateral meniscectomies and shaving chondroplasty on 1/19/22. Patient reporting that right knee pain is worse than left knee pain at this time.  approved left TKA. -Discussed with patient that she has moderate to advanced right knee oa but intraoperatively her djd was more advanced. -Discussed with patient risks, benefits, alternatives of right TKA. -We talked about the details of the surgery, the recovery, the material risks and possible benefits and alternatives, including but not limited to infection, bleeding, need for blood transfusion, need for reoperation, stiffness, possible damage to nerves and blood vessels, failure of fixation of the components, risk of deep venous thrombosis and pulmonary embolism, wound healing problems and risk of dislocation and leg length discrepancy. We talked about bearing surface options and discussed the benefits and potential risks with each. The patient understands these risks and questions were answered. The patient understands they will need medical clearance. The patient understands they will be on anticoagulant therapy post op and the benefits and risks of. -Activities as tolerated -Rest, ice, compression, elevation, NSAIDs PRN for pain.  -All questions answered -F/u after surgery  Chance of NOT Returning Home (at discharge): 34 %; Predicted Length of Stay: 3.4 days  The diagnosis was explained in detail. The potential non-surgical and surgical treatments were reviewed. The relative risks and benefits of each option were considered relative to the patient's age, activity level, medical history, symptom severity and previously attempted treatments.  The patient was advised to consult with their primary medical provider prior to initiation of any new medications to reduce the risk of adverse effects specific to their long-term home medications and medical history. The risk of gastrointestinal irritation and kidney injury specific to long-term NSAID use was discussed.  Entered by Abdoulaye Frye PA-C acting as scribe. Dr. Randall Attestation The documentation recorded by the scribe, in my presence, accurately reflects the service I, Dr. Randall, personally performed, and the decisions made by me with my edits as appropriate.

## 2024-02-15 NOTE — WORK
[Torn Ligament/Tendon/Muscle] : torn ligament, tendon or muscle [Was the competent medical cause of the injury] : was the competent medical cause of the injury [Are consistent with the injury] : are consistent with the injury [Consistent with my objective findings] : consistent with my objective findings [Reveals pre-existing condition(s) that may affect treatment/prognosis] : reveals pre-existing condition(s) that may affect treatment/prognosis [Cannot return to work because ________] : cannot return to work because [unfilled] [Patient] : patient [No Rx restrictions] : No Rx restrictions. [I provided the services listed above] :  I provided the services listed above. [FreeTextEntry1] : partial [FreeTextEntry2] : knee chondromalacia

## 2024-02-27 ENCOUNTER — RX RENEWAL (OUTPATIENT)
Age: 74
End: 2024-02-27

## 2024-03-04 ENCOUNTER — APPOINTMENT (OUTPATIENT)
Dept: FAMILY MEDICINE | Facility: CLINIC | Age: 74
End: 2024-03-04
Payer: MEDICARE

## 2024-03-04 VITALS
SYSTOLIC BLOOD PRESSURE: 120 MMHG | RESPIRATION RATE: 15 BRPM | OXYGEN SATURATION: 98 % | BODY MASS INDEX: 23.66 KG/M2 | TEMPERATURE: 98 F | HEART RATE: 59 BPM | HEIGHT: 65 IN | DIASTOLIC BLOOD PRESSURE: 70 MMHG | WEIGHT: 142 LBS

## 2024-03-04 DIAGNOSIS — Z87.898 PERSONAL HISTORY OF OTHER SPECIFIED CONDITIONS: ICD-10-CM

## 2024-03-04 DIAGNOSIS — U09.9 CHRONIC COUGH: ICD-10-CM

## 2024-03-04 DIAGNOSIS — Z87.09 PERSONAL HISTORY OF OTHER DISEASES OF THE RESPIRATORY SYSTEM: ICD-10-CM

## 2024-03-04 DIAGNOSIS — R42 DIZZINESS AND GIDDINESS: ICD-10-CM

## 2024-03-04 DIAGNOSIS — G47.00 INSOMNIA, UNSPECIFIED: ICD-10-CM

## 2024-03-04 DIAGNOSIS — Z92.29 PERSONAL HISTORY OF OTHER DRUG THERAPY: ICD-10-CM

## 2024-03-04 DIAGNOSIS — R05.3 CHRONIC COUGH: ICD-10-CM

## 2024-03-04 DIAGNOSIS — U09.9 MYALGIC ENCEPHALOMYELITIS/CHRONIC FATIGUE SYNDROME: ICD-10-CM

## 2024-03-04 DIAGNOSIS — F41.9 ANXIETY DISORDER, UNSPECIFIED: ICD-10-CM

## 2024-03-04 DIAGNOSIS — Z86.79 PERSONAL HISTORY OF OTHER DISEASES OF THE CIRCULATORY SYSTEM: ICD-10-CM

## 2024-03-04 DIAGNOSIS — M75.21 BICIPITAL TENDINITIS, RIGHT SHOULDER: ICD-10-CM

## 2024-03-04 DIAGNOSIS — U07.1 COVID-19: ICD-10-CM

## 2024-03-04 DIAGNOSIS — R91.1 SOLITARY PULMONARY NODULE: ICD-10-CM

## 2024-03-04 DIAGNOSIS — Z12.11 ENCOUNTER FOR SCREENING FOR MALIGNANT NEOPLASM OF COLON: ICD-10-CM

## 2024-03-04 DIAGNOSIS — M79.673 PAIN IN UNSPECIFIED FOOT: ICD-10-CM

## 2024-03-04 DIAGNOSIS — G93.32 MYALGIC ENCEPHALOMYELITIS/CHRONIC FATIGUE SYNDROME: ICD-10-CM

## 2024-03-04 PROCEDURE — 99214 OFFICE O/P EST MOD 30 MIN: CPT

## 2024-03-04 RX ORDER — ZOLPIDEM TARTRATE 5 MG/1
5 TABLET ORAL
Qty: 30 | Refills: 5 | Status: ACTIVE | COMMUNITY
Start: 2017-11-08 | End: 1900-01-01

## 2024-03-04 RX ORDER — PRAVASTATIN SODIUM 20 MG/1
20 TABLET ORAL
Qty: 90 | Refills: 1 | Status: DISCONTINUED | COMMUNITY
Start: 2021-10-28 | End: 2024-03-04

## 2024-03-04 RX ORDER — BUPROPION HYDROCHLORIDE 150 MG/1
150 TABLET, EXTENDED RELEASE ORAL
Qty: 30 | Refills: 0 | Status: DISCONTINUED | COMMUNITY
Start: 2023-02-13 | End: 2024-03-04

## 2024-03-04 NOTE — ASSESSMENT
[FreeTextEntry1] : pt with history of hyperlipidemia. not on a statin- she defers repeat testing  right now.  She has just had R TKR 5 m ago ( workmans comp injury) . she is healing but since then her mood has been down. dc wellbutrin change to prozac 20 mg qd  renew ambien 5 mg qhs prn insomnia 30 5 rf screening breast cancer- mammogram and bilateral breast us screening colon cancer - colonoscopy

## 2024-03-04 NOTE — PHYSICAL EXAM
[Normal] : no respiratory distress, lungs were clear to auscultation bilaterally and no accessory muscle use [de-identified] : few missed beats

## 2024-03-04 NOTE — HISTORY OF PRESENT ILLNESS
[FreeTextEntry1] : Patient presents for medication renewals also wants to discuss switching antidepressant. [de-identified] :  72 yo wf here for follow up. Last seen in 2022 by me . she had right total knee  5 months ago she fell at work  in 2021   Since I had my knee my mood has been decreased. the wellbutrin didnt help and i was on lexapro which i did like but i didnt like the side effects and she would like to try prozac,   I dont have asthma  I need colonoscopy I need mammogram breast us- dense breast

## 2024-03-12 ENCOUNTER — APPOINTMENT (OUTPATIENT)
Dept: ORTHOPEDIC SURGERY | Facility: CLINIC | Age: 74
End: 2024-03-12

## 2024-03-20 ENCOUNTER — TRANSCRIPTION ENCOUNTER (OUTPATIENT)
Age: 74
End: 2024-03-20

## 2024-03-20 ENCOUNTER — APPOINTMENT (OUTPATIENT)
Dept: ORTHOPEDIC SURGERY | Facility: CLINIC | Age: 74
End: 2024-03-20
Payer: OTHER MISCELLANEOUS

## 2024-03-20 DIAGNOSIS — M47.817 SPONDYLOSIS W/OUT MYELOPATHY OR RADICULOPATHY, LUMBOSACRAL REGION: ICD-10-CM

## 2024-03-20 DIAGNOSIS — M54.16 RADICULOPATHY, LUMBAR REGION: ICD-10-CM

## 2024-03-20 DIAGNOSIS — M48.061 SPINAL STENOSIS, LUMBAR REGION WITHOUT NEUROGENIC CLAUDICATION: ICD-10-CM

## 2024-03-20 DIAGNOSIS — G96.191 PERINEURAL CYST: ICD-10-CM

## 2024-03-20 DIAGNOSIS — M51.36 OTHER INTERVERTEBRAL DISC DEGENERATION, LUMBAR REGION: ICD-10-CM

## 2024-03-20 DIAGNOSIS — M70.61 TROCHANTERIC BURSITIS, RIGHT HIP: ICD-10-CM

## 2024-03-20 DIAGNOSIS — M43.16 SPONDYLOLISTHESIS, LUMBAR REGION: ICD-10-CM

## 2024-03-20 DIAGNOSIS — M51.26 OTHER INTERVERTEBRAL DISC DISPLACEMENT, LUMBAR REGION: ICD-10-CM

## 2024-03-20 PROCEDURE — 72100 X-RAY EXAM L-S SPINE 2/3 VWS: CPT

## 2024-03-20 PROCEDURE — 99214 OFFICE O/P EST MOD 30 MIN: CPT

## 2024-03-20 NOTE — PHYSICAL EXAM
[Right] : right hip [5___] : adduction 5[unfilled]/5 [] : no erythema [de-identified] : external rotation 40 degrees [TWNoteComboBox6] : internal rotation 30 degrees

## 2024-03-20 NOTE — IMAGING
[Disc space narrowing] : Disc space narrowing [FreeTextEntry1] : Spondy L4-5 with 2mm more forward translation and progression of DDD as compared to 2021.  [Spondylolithesis] : Spondylolithesis

## 2024-03-20 NOTE — ASSESSMENT
[FreeTextEntry1] : 2022 MRI lumbar: Multilevel lumbar spondylosis  Spondylolisthesis L4-5 with severe central stenosis   72 yo female presents today for follow up on her low back pain. X-ray from today shows worsening of spondylolisthesis L4-5 and progression of DDD. Patient with persistent low back pain and radiculopathy. I recommend proceeding with new MRI due to new changes seen structurally.   - Patient given prescription for MRI, follow up after study is completed to discuss results.   - Recommend physical therapy to regain range of motion, strengthening and symptomatic improvement. Prescription given in office today.   - Recommend NSAIDs PRN - Recommend heating pad use to decrease muscle spasm - Discussed the importance of home exercises, including but not limited to hamstring stretching and core strengthening   Patient was educated on their diagnosis today. All questions answered and patient expressed understanding.  Worker's compensation 100% temporarily disabled   Follow up after MRI

## 2024-03-20 NOTE — HISTORY OF PRESENT ILLNESS
[de-identified] : Follow up lumbar spine. Patient states she has pain radiating down her right leg. Patient admits to taking Gabapentin and Motril for pain.   Today's Pain with walking 6/10

## 2024-04-14 ENCOUNTER — TRANSCRIPTION ENCOUNTER (OUTPATIENT)
Age: 74
End: 2024-04-14

## 2024-04-15 ENCOUNTER — TRANSCRIPTION ENCOUNTER (OUTPATIENT)
Age: 74
End: 2024-04-15

## 2024-04-15 RX ORDER — FLUOXETINE HYDROCHLORIDE 20 MG/1
20 CAPSULE ORAL
Qty: 30 | Refills: 11 | Status: ACTIVE | COMMUNITY
Start: 2024-03-04 | End: 1900-01-01

## 2024-04-15 RX ORDER — FLUOXETINE HYDROCHLORIDE 10 MG/1
10 CAPSULE ORAL
Qty: 30 | Refills: 11 | Status: ACTIVE | COMMUNITY
Start: 2024-04-15 | End: 1900-01-01

## 2024-05-15 ENCOUNTER — TRANSCRIPTION ENCOUNTER (OUTPATIENT)
Age: 74
End: 2024-05-15

## 2024-05-15 RX ORDER — GABAPENTIN 100 MG/1
100 CAPSULE ORAL 3 TIMES DAILY
Qty: 90 | Refills: 2 | Status: ACTIVE | COMMUNITY
Start: 2023-10-12 | End: 1900-01-01

## 2024-05-28 ENCOUNTER — RESULT REVIEW (OUTPATIENT)
Age: 74
End: 2024-05-28

## 2024-06-03 RX ORDER — AMOXICILLIN 500 MG/1
500 TABLET, FILM COATED ORAL
Qty: 4 | Refills: 0 | Status: ACTIVE | COMMUNITY
Start: 2024-06-03 | End: 1900-01-01

## 2024-06-25 ENCOUNTER — APPOINTMENT (OUTPATIENT)
Dept: ORTHOPEDIC SURGERY | Facility: CLINIC | Age: 74
End: 2024-06-25
Payer: OTHER MISCELLANEOUS

## 2024-06-25 DIAGNOSIS — Z96.651 PRESENCE OF RIGHT ARTIFICIAL KNEE JOINT: ICD-10-CM

## 2024-06-25 PROCEDURE — 99213 OFFICE O/P EST LOW 20 MIN: CPT

## 2024-06-25 PROCEDURE — 73562 X-RAY EXAM OF KNEE 3: CPT | Mod: RT

## 2024-07-05 ENCOUNTER — TRANSCRIPTION ENCOUNTER (OUTPATIENT)
Age: 74
End: 2024-07-05

## 2024-07-06 ENCOUNTER — TRANSCRIPTION ENCOUNTER (OUTPATIENT)
Age: 74
End: 2024-07-06

## 2024-07-10 ENCOUNTER — TRANSCRIPTION ENCOUNTER (OUTPATIENT)
Age: 74
End: 2024-07-10

## 2024-07-16 ENCOUNTER — TRANSCRIPTION ENCOUNTER (OUTPATIENT)
Age: 74
End: 2024-07-16

## 2024-07-18 ENCOUNTER — RESULT REVIEW (OUTPATIENT)
Age: 74
End: 2024-07-18

## 2024-07-19 ENCOUNTER — TRANSCRIPTION ENCOUNTER (OUTPATIENT)
Age: 74
End: 2024-07-19

## 2024-07-27 ENCOUNTER — TRANSCRIPTION ENCOUNTER (OUTPATIENT)
Age: 74
End: 2024-07-27

## 2024-07-31 ENCOUNTER — APPOINTMENT (OUTPATIENT)
Dept: ORTHOPEDIC SURGERY | Facility: CLINIC | Age: 74
End: 2024-07-31
Payer: OTHER MISCELLANEOUS

## 2024-07-31 DIAGNOSIS — M48.061 SPINAL STENOSIS, LUMBAR REGION WITHOUT NEUROGENIC CLAUDICATION: ICD-10-CM

## 2024-07-31 DIAGNOSIS — M54.16 RADICULOPATHY, LUMBAR REGION: ICD-10-CM

## 2024-07-31 DIAGNOSIS — M51.36 OTHER INTERVERTEBRAL DISC DEGENERATION, LUMBAR REGION: ICD-10-CM

## 2024-07-31 DIAGNOSIS — G96.191 PERINEURAL CYST: ICD-10-CM

## 2024-07-31 DIAGNOSIS — M51.26 OTHER INTERVERTEBRAL DISC DISPLACEMENT, LUMBAR REGION: ICD-10-CM

## 2024-07-31 DIAGNOSIS — M70.61 TROCHANTERIC BURSITIS, RIGHT HIP: ICD-10-CM

## 2024-07-31 DIAGNOSIS — M47.817 SPONDYLOSIS W/OUT MYELOPATHY OR RADICULOPATHY, LUMBOSACRAL REGION: ICD-10-CM

## 2024-07-31 DIAGNOSIS — M43.16 SPONDYLOLISTHESIS, LUMBAR REGION: ICD-10-CM

## 2024-07-31 PROCEDURE — 99214 OFFICE O/P EST MOD 30 MIN: CPT

## 2024-08-04 NOTE — DISCUSSION/SUMMARY
[de-identified] : I discussed non operative and operative treatment options in great detail with the patient. I discussed treatment options, including but not limited to, 1. Non operative treatment - rest, NSAID, physical therapy etc. 2. Interventional treatment - injections etc. 3. Surgical treatment - Laminectomy and Instrumented Fusion L4-5, autograft, allograft  - Patient with persistent symptoms refractory to non-operative care. Patient is seeking surgical options. Patient is a candidate for surgery after failing extensive non operative care.  Patient wants to proceed with surgical intervention after failing nonoperative care. I had a lengthy discussion with the patient about the rational and goal of the surgery as well as expected outcome. I encouraged patient to seek a second opinion regarding surgery. I explained postoperative rehabilitation and recovery process to the patient. I discussed risks, benefits and alternatives of the procedure in detail with the patient. I counseled patient about risks and possible complications, including but not limited to, infection, bleeding, nerve injury, arterial and venous injury, single or multiple muscle group weakness, dural tear, CSF leak, pseudomeningocele, arachnoiditis, CSF fistula, meningitis, discitis, osteomyelitis, epidural hematoma, DVT, PE, CRPS, MI, epidural hematoma, paralysis, pseudoarthrosis, instrumentation malposition, adjacent segment disease, persistent symptoms, and risks of anesthesia. I explained to the patient that the surgical outcome is unpredictable and there is no guarantee that the symptoms will resolve after the surgery. The patient understands and wishes to proceed. All questions were answered and patient was given information to review.

## 2024-08-04 NOTE — DATA REVIEWED
[MRI] : MRI [Lumbar Spine] : lumbar spine [I independently reviewed and interpreted images and report] : I independently reviewed and interpreted images and report [FreeTextEntry1] : 5/2024 MRI of L-Spine was reviewed today and is as follows:  Multilevel spondylosis  Spondylolisthesis L4-5 with severe stenosis

## 2024-08-04 NOTE — HISTORY OF PRESENT ILLNESS
[de-identified] : Follow up lumbar spine MRI Results at . Patient states her pain continues down her right leg. Patient admits to taking Gabapentin and Motrin for pain.   Today's Pain with walking 6/10

## 2024-08-04 NOTE — ASSESSMENT
[FreeTextEntry1] :   2022 MRI lumbar: Multilevel lumbar spondylosis  Spondylolisthesis L4-5 with severe central stenosis   5/2024 MRI of L-Spine was reviewed today and is as follows:  Multilevel spondylosis  Spondylolisthesis L4-5 with severe stenosis   74 yo female presents today for follow up on her low back pain. Recent MRI shows persistent severe stenosis at L4-5. I discussed with patient that given persistent symptoms despite conservative management of PT, LADAN, and medications, surgical management is indicated.   - Patient with persistent symptoms refractory to non-operative care. Patient is seeking surgical options. Patient is a candidate for surgery after failing extensive non operative care.   - Recommend NSAIDs PRN - Recommend heating pad use to decrease muscle spasm - Discussed the importance of home exercises, including but not limited to hamstring stretching and core strengthening   Patient was educated on their diagnosis today. All questions answered and patient expressed understanding.  Worker's compensation 100% temporarily disabled   Follow up in 2 months

## 2024-08-04 NOTE — PHYSICAL EXAM
[Right] : right hip [5___] : adduction 5[unfilled]/5 [] : no palpable masses [de-identified] : external rotation 40 degrees [TWNoteComboBox6] : internal rotation 30 degrees

## 2024-08-15 ENCOUNTER — RX RENEWAL (OUTPATIENT)
Age: 74
End: 2024-08-15

## 2024-09-03 ENCOUNTER — APPOINTMENT (OUTPATIENT)
Dept: ORTHOPEDIC SURGERY | Facility: CLINIC | Age: 74
End: 2024-09-03

## 2024-09-23 ENCOUNTER — APPOINTMENT (OUTPATIENT)
Dept: ORTHOPEDIC SURGERY | Facility: HOSPITAL | Age: 74
End: 2024-09-23

## 2024-11-15 ENCOUNTER — RX RENEWAL (OUTPATIENT)
Age: 74
End: 2024-11-15